# Patient Record
Sex: FEMALE | Race: WHITE | NOT HISPANIC OR LATINO | Employment: PART TIME | URBAN - METROPOLITAN AREA
[De-identification: names, ages, dates, MRNs, and addresses within clinical notes are randomized per-mention and may not be internally consistent; named-entity substitution may affect disease eponyms.]

---

## 2017-03-08 ENCOUNTER — TELEPHONE (OUTPATIENT)
Dept: FAMILY MEDICINE | Facility: CLINIC | Age: 52
End: 2017-03-08

## 2017-03-08 NOTE — TELEPHONE ENCOUNTER
Reason for Call:  Other    Detailed comments: Lay says she was patient of Dr Perez and has apt to see Angelica Glover on Friday, March 10. She wonders if she needs a pap this year. Last year she had a colposcopy with Dr Perez. She thinks she is starting menopause. She has not had a period for 9 months.     Phone Number Patient can be reached at: Home number on file [phone] (home)    Best Time: anytime    Can we leave a detailed message on this number? YES    Call taken on 3/8/2017 at 2:23 PM by Jazmyn Espinal

## 2017-03-10 ENCOUNTER — OFFICE VISIT (OUTPATIENT)
Dept: FAMILY MEDICINE | Facility: CLINIC | Age: 52
End: 2017-03-10
Payer: COMMERCIAL

## 2017-03-10 VITALS
BODY MASS INDEX: 23.91 KG/M2 | HEART RATE: 60 BPM | SYSTOLIC BLOOD PRESSURE: 112 MMHG | DIASTOLIC BLOOD PRESSURE: 58 MMHG | TEMPERATURE: 97.8 F | WEIGHT: 153.8 LBS

## 2017-03-10 DIAGNOSIS — S29.012A MUSCLE STRAIN OF LEFT UPPER BACK, INITIAL ENCOUNTER: Primary | ICD-10-CM

## 2017-03-10 PROCEDURE — 99213 OFFICE O/P EST LOW 20 MIN: CPT | Performed by: NURSE PRACTITIONER

## 2017-03-10 NOTE — NURSING NOTE
"Chief Complaint   Patient presents with     Back Pain       Initial /58 (BP Location: Right arm, Cuff Size: Adult Regular)  Pulse 60  Temp 97.8  F (36.6  C) (Tympanic)  Wt 153 lb 12.8 oz (69.8 kg)  BMI 23.91 kg/m2 Estimated body mass index is 23.91 kg/(m^2) as calculated from the following:    Height as of 2/15/16: 5' 7.25\" (1.708 m).    Weight as of this encounter: 153 lb 12.8 oz (69.8 kg).  Medication Reconciliation: complete    Health Maintenance that is potentially due pending provider review:  Mammogram and Colonoscopy/FIT    Gave pt phone number/pended order to schedule mammo and/or colonoscopy(or FIT)  Jose Luis Kang M.A.      "

## 2017-03-10 NOTE — MR AVS SNAPSHOT
After Visit Summary   3/10/2017    Lay Mccain    MRN: [id number]           Patient Information     Date Of Birth          1965        Visit Information        Provider Department      3/10/2017 9:20 AM Angelica Glover APRN CNP [facility]        Today's Diagnoses     Muscle strain of left upper back, initial encounter    -  1      Care Instructions    Start with Physical therapy     Can continue or start to see Chiropractor    Heat to area at least 20 minutes then do stretches after     Can try ibuprofen 600 mg two times daily at least for 3 days, if not noticing any change or improvement of pain - can stop     Return to clinic in 3 weeks if not improvement     [facility] Schedule      [place]- [phone]   2nd/4th Monday morning   Every other Wednesday afternoon      [place]- [phone]   2nd/4th Wednesday morning      [place]- [phone]   1st/3rd Wednesday morning      [facility]- [phone]   2nd/4th Monday afternoon   Every other Wednesday afternoon      [place]- [phone]   Every Monday morning   Every Tuesday afternoon   Every Wednesday, Thursday, Friday          Follow-ups after your visit        Additional Services     PHYSICAL THERAPY REFERRAL       *This therapy referral will be filtered to a centralized scheduling office at [facility] and the patient will receive a call to schedule an appointment at a [facility] location most convenient for them. *     [facility] provides Physical Therapy evaluation and treatment and many specialty services across the [facility] system.  If requesting a specialty program, please choose from the list below.    If you have not heard from the scheduling office within 2 business days, please call [phone] for all locations, with the exception of [place], please call [phone].  Treatment: Evaluation & Treatment  Special Instructions/Modalities:   Special Programs:  None    Please be aware that coverage of these services is subject to the terms and limitations of your health insurance plan.  Call member services at your health plan with any benefit or coverage questions.      **Note to Provider:  If you are referring outside of [facility] for the therapy appointment, please list the name of the location in the  special instructions  above, print the referral and give to the patient to schedule the appointment.                  Follow-up notes from your care team     Return in about 3 weeks (around 3/31/2017).      Who to contact     If you have questions or need follow up information about today's clinic visit or your schedule please contact [facility] directly at [phone].  Normal or non-critical lab and imaging results will be communicated to you by Wooshiihart, letter or phone within 4 business days after the clinic has received the results. If you do not hear from us within 7 days, please contact the clinic through AppSociallyt or phone. If you have a critical or abnormal lab result, we will notify you by phone as soon as possible.  Submit refill requests through Exosect or call your pharmacy and they will forward the refill request to us. Please allow 3 business days for your refill to be completed.          Additional Information About Your Visit        WooshiiharFeaturespace Information     Exosect gives you secure access to your electronic health record. If you see a primary care provider, you can also send messages to your care team and make appointments. If you have questions, please call your primary care clinic.  If you do not have a primary care provider, please call [phone] and they will assist you.        Care EveryWhere ID     This is your Care EveryWhere ID. This could be used by other organizations to access your [place] medical records  [id number]        Your Vitals Were     Pulse Temperature BMI (Body Mass Index)             60 97.8  F (36.6  C)  (Tympanic) 23.91 kg/m2          Blood Pressure from Last 3 Encounters:   03/10/17 112/58   03/14/16 124/62   02/15/16 110/72    Weight from Last 3 Encounters:   03/10/17 153 lb 12.8 oz (69.8 kg)   03/14/16 148 lb 3.2 oz (67.2 kg)   02/15/16 154 lb 3.2 oz (69.9 kg)              We Performed the Following     PHYSICAL THERAPY REFERRAL        Primary Care Provider Office Phone # Fax #    [facility]IR[facility] [phone]       [facility] [address] [facility] [address]        Thank you!     Thank you for choosing [facility]  for your care. Our goal is always to provide you with excellent care. Hearing back from our patients is one way we can continue to improve our services. Please take a few minutes to complete the written survey that you may receive in the mail after your visit with us. Thank you!             Your Updated Medication List - Protect others around you: Learn how to safely use, store and throw away your medicines at www.disposemymeds.org.          This list is accurate as of: 3/10/17 10:04 AM.  Always use your most recent med list.                   Brand Name Dispense Instructions for use    BIOTIN MAXIMUM STRENGTH 5 MG Caps   Generic drug:  biotin     30 capsule    Reported on 3/10/2017       CALCIUM + D PO      Take  by mouth.       DAILY MULTI VITAMIN/MINERALS PO      Take  by mouth.       hydroquinone 4 % Crea     30 g    Apply at bedtime       lidocaine 2 % topical gel    XYLOCAINE    60 mL    Apply  topically as needed.       TURMERIC PO          ZINC 15 PO      Reported on 3/10/2017

## 2017-03-10 NOTE — PATIENT INSTRUCTIONS
Start with Physical therapy     Can continue or start to see Chiropractor    Heat to area at least 20 minutes then do stretches after     Can try ibuprofen 600 mg two times daily at least for 3 days, if not noticing any change or improvement of pain - can stop     Return to clinic in 3 weeks if not improvement     [facility]      [place]- [phone]   2nd/4th Monday morning   Every other Wednesday afternoon      [place]- [phone]   2nd/4th Wednesday morning      [place]- [phone]   1st/3rd Wednesday morning      [facility] [phone]   2nd/4th Monday afternoon   Every other Wednesday afternoon      [place]- [phone]   Every Monday morning   Every Tuesday afternoon   Every Wednesday, Thursday, Friday

## 2017-03-10 NOTE — PROGRESS NOTES
SUBJECTIVE:                                                    Lay Mccain is a 51 year old female who presents to clinic today for the following health issues:    Back Pain      Duration: about a year ago.         Specific cause: none    Description:   Location of pain: upper back (left), shoulder blade   Character of pain: dull ache and burning  Pain radiation:wraps around to the front of left breast.   New numbness or weakness in legs, not attributed to pain:  no     Intensity: Currently 4/10, more of irritation    History:   Pain interferes with job: No  History of back problems: Has been dealing with for years.  Last 2 years though changed from shoulder blade area to more of back and coming forward  Any previous MRI or X-rays: None  Sees a specialist for back pain:  No  Therapies tried without relief: chiropractor, Physical Therapy and stretch    Alleviating factors:   Improved by: exercise and heat      Precipitating factors:  Worsened by: Nothing    Functional and Psychosocial Screen ([place] STarT Back):      Not performed today       Accompanying Signs & Symptoms:  Risk of Fracture:  None  Risk of Cauda Equina:  None  Risk of Infection:  None  Risk of Cancer:  None  Risk of Ankylosing Spondylitis:  Onset at age <35, male, AND morning back stiffness. no                  2 years ago [place] [employer] [place] going [place] the [place] on a tube and woke the next morning with this pain in shoulder   Constant, dull pain   At night sleeping okay  In the morning is the best       Problem list and histories reviewed & adjusted, as indicated.  Additional history: as documented    Patient Active Problem List   Diagnosis     CARDIOVASCULAR SCREENING; LDL GOAL LESS THAN 160     Vulvar discomfort     Premature atrial contractions     Annual physical exam     Left ankle swelling     Abnormal antinuclear antibody titer     Generalized anxiety disorder     Abnormal Pap smear of cervix     Past Surgical History   Procedure  Laterality Date     No history of surgery  11/2011       Social History   Substance Use Topics     Smoking status: Never Smoker     Smokeless tobacco: Never Used     Alcohol use Yes      Comment: Avg. 5 drinks per week     Family History   Problem Relation Age of Onset     HEART DISEASE Mother      bicuspid aortic valve     Hypertension Mother      CEREBROVASCULAR DISEASE Maternal Grandmother      HEART DISEASE Maternal Grandmother      CEREBROVASCULAR DISEASE Maternal Grandfather      HEART DISEASE Maternal Grandfather      HEART DISEASE Paternal Grandfather      CANCER Brother      Passed away 12/2014         Current Outpatient Prescriptions   Medication Sig Dispense Refill     TURMERIC PO        Multiple Vitamins-Minerals (DAILY MULTI VITAMIN/MINERALS PO) Take  by mouth.       Calcium Carbonate-Vitamin D (CALCIUM + D PO) Take  by mouth.       lidocaine (XYLOCAINE) 2 % jelly Apply  topically as needed. (Patient not taking: Reported on 3/10/2017) 60 mL 1     Zinc Sulfate (ZINC 15 PO) Reported on 3/10/2017       biotin (BIOTIN MAXIMUM STRENGTH) 5 MG CAPS Reported on 3/10/2017 30 capsule      hydroquinone 4 % CREA Apply at bedtime (Patient not taking: Reported on 3/10/2017) 30 g 3     Allergies   Allergen Reactions     Clindamycin Hives and Rash     Vicodin [Hydrocodone-Acetaminophen]      Passed out       Reviewed and updated as needed this visit by clinical staff  Tobacco  Allergies  Meds  Problems  Med Hx  Surg Hx  Fam Hx  Soc Hx        Reviewed and updated as needed this visit by Provider  Allergies  Meds  Problems         ROS:  Constitutional, HEENT, cardiovascular, pulmonary, gi and gu systems are negative, except as otherwise noted.    OBJECTIVE:                                                    /58 (BP Location: Right arm, Cuff Size: Adult Regular)  Pulse 60  Temp 97.8  F (36.6  C) (Tympanic)  Wt 153 lb 12.8 oz (69.8 kg)  BMI 23.91 kg/m2  Body mass index is 23.91 kg/(m^2).  GENERAL  APPEARANCE: healthy, alert and no distress  MS: extremities normal- no gross deformities noted, peripheral pulses normal, normal strength and range of motion through out upper extremities with some pulling in left armpit with abduction of the arms. No swelling or erythema noted    Diagnostic Test Results:  none      ASSESSMENT/PLAN:                                                      1. Muscle strain of left upper back, initial encounter  Has had on/off for a couple of years. Feels pulling in upper left shoulder into under armpit and left breast. No swelling or erythema.   - PHYSICAL THERAPY REFERRAL  - Supportive cares and anti-inflammatories  - Would consider muscle relaxant if anti-inflammatories ineffective     See Patient Instructions    KIRA [facility]

## 2017-03-15 ENCOUNTER — HOSPITAL ENCOUNTER (OUTPATIENT)
Dept: PHYSICAL THERAPY | Facility: CLINIC | Age: 52
Setting detail: THERAPIES SERIES
End: 2017-03-15
Attending: NURSE PRACTITIONER
Payer: COMMERCIAL

## 2017-03-15 PROCEDURE — 40000185 ZZHC STATISTIC PT OUTPT VISIT

## 2017-03-15 PROCEDURE — 97161 PT EVAL LOW COMPLEX 20 MIN: CPT | Mod: GP

## 2017-03-15 PROCEDURE — 97140 MANUAL THERAPY 1/> REGIONS: CPT | Mod: GP

## 2017-03-15 PROCEDURE — 97110 THERAPEUTIC EXERCISES: CPT | Mod: GP

## 2017-03-15 NOTE — PROGRESS NOTES
PT Cervical Evaluation 03/15/17 1500   General Information   Type of Visit Initial OP Ortho PT Evaluation   Start of Care Date 03/15/17   Referring Physician Angelica Glover, APRN, CNP   Patient/Family Goals Statement get rid of pain   Orders Evaluate and Treat   Date of Order 03/10/17   Insurance Type [employer]   Medical Diagnosis Muscle strain of left upper back, initial encounter    Surgical/Medical history reviewed Yes  (none)   Body Part(s)   Body Part(s) Cervical Spine   Presentation and Etiology   Pertinent history of current problem (include personal factors and/or comorbidities that impact the POC) Pt reports going [place] and woke up the next day with intense pain in her L shld blade area and into arm over 2 years ago in June 2014. She saw a chiropractor which helped her arm feel better. But after a while, she didn't feel like he helped anymore. 6 months later it progressed to around the side and to her L breast.  She reports being able to do all her activities, but she is tired of having the constant pain. Her sleep is occasionally interrupted by pain.   Impairments A. Pain;J. Burning;K. Numbness;L. Tingling  (N&T in L arm and fingers occasionally)   Functional Limitations (Pt is able to do things, but the pain is wearing her down)   Symptom Location L scapular area wrapping around to breast   How/Where did it occur During recreation/sports   Onset date of current episode/exacerbation 06/14/14   Chronicity Chronic   Pain rating (0-10 point scale) Best (/10);Worst (/10)   Best (/10) 1  (warm heating pad)   Worst (/10) 7  (sharp pains in breast)   Pain quality B. Dull;C. Aching;D. Burning;A. Sharp   Frequency of pain/symptoms A. Constant   Pain/symptoms are: Worse during the night   Pain/symptoms exacerbated by F. Nothing  (initially couldn't lie on L side)   Pain/symptoms eased by G. Heat  (exercising)   Progression of symptoms since onset: Unchanged   Prior Level of Function   Prior Level of  Function-Mobility independent   Prior Level of Function-ADLs independent   Current Level of Function   Current Community Support Family/friend caregiver   Patient role/employment history C. Homemaker   Living environment House/townhome   Home/community accessibility drives   Fall Risk Screen   Fall screen completed by PT   Per patient - Fall 2 or more times in past year? No   Per patient - Fall with injury in past year? No   Is patient a fall risk? No   Functional Scales   Functional Scales (SPADI 19.23)   Cervical Spine   Posture fwd head, rounded fwd shlds   Cervical/Thoracic/Shoulder ROM Comments WNL with mild discomfort near superior angle of L scapula with cervical flex and L rot   Shoulder Shrug (C2-C4) Strength 5/5   Shoulder Abd (C5) Strength 5/5   Shoulder ER (C5, C6) Strength 5/5   Shoulder IR (C5, C6) Strength 5/5   Elbow Flexion (C5, C6) Strength 5/5   Elbow Extension (C7) Strength 5/5   Wrist Extension (C6) Strength 5/5   Wrist Flexion (C7) Strength 5/5   Thumb Abd (C8) Strength 5/5   5th Finger Add (T1) Strength 5/5   Shoulder/Wrist/Hand Strength Comments B shld ext 4+/5, middle trap 4/5, lower trap 3+/5   Upper Trapezius Flexibility normal   Levator Scapula Flexibility normal   Scalene Flexibility normal   Pectoralis Minor Flexibility normal   Spurling Test -   Cervical Distraction Test -   Neer Impingement Test -   Hunter Impingement Test -   Segmental Mobility-Cervical tender to PA glide to C4-5   Palpation tender just medial and superior to superior angle of L scapula, no tenderness L UT, teres, infraspinatus, under L breast   Planned Therapy Interventions   Planned Therapy Interventions manual therapy;joint mobilization;strengthening;stretching   Clinical Impression   Criteria for Skilled Therapeutic Interventions Met yes, treatment indicated   PT Diagnosis L scapular pain. Signs and symptoms suggest cervical involvement aggravated by poor posture.   Influenced by the following impairments pain,  poor posture   Functional limitations due to impairments sleep   Clinical Presentation Stable/Uncomplicated   Clinical Presentation Rationale chronic, unchanged   Clinical Decision Making (Complexity) Low complexity   Therapy Frequency 1 time/week   Predicted Duration of Therapy Intervention (days/wks) 4 weeks   Risk & Benefits of therapy have been explained Yes   Patient, Family & other staff in agreement with plan of care Yes   Education Assessment   Preferred Learning Style Listening;Demonstration;Pictures/video   Barriers to Learning No barriers   ORTHO GOALS   PT Ortho Eval Goals 1;2;3   Ortho Goal 1   Goal Identifier 1   Goal Description Pt will sleep without waking from pain.   Target Date 03/29/17   Ortho Goal 2   Goal Identifier 2   Goal Description Pt will demonstrate proper posture 100% of the time.   Target Date 04/12/17   Ortho Goal 3   Goal Identifier 3   Goal Description Pt will be independent with HEP for optimal recovery.   Target Date 04/12/17   Total Evaluation Time   Total Evaluation Time 30     Tameka Arriola PT

## 2017-03-22 ENCOUNTER — HOSPITAL ENCOUNTER (OUTPATIENT)
Dept: PHYSICAL THERAPY | Facility: CLINIC | Age: 52
Setting detail: THERAPIES SERIES
End: 2017-03-22
Attending: FAMILY MEDICINE
Payer: COMMERCIAL

## 2017-03-22 PROCEDURE — 97110 THERAPEUTIC EXERCISES: CPT | Mod: GP | Performed by: PHYSICAL THERAPIST

## 2017-03-22 PROCEDURE — 40000718 ZZHC STATISTIC PT DEPARTMENT ORTHO VISIT: Performed by: PHYSICAL THERAPIST

## 2017-03-22 PROCEDURE — 97140 MANUAL THERAPY 1/> REGIONS: CPT | Mod: GP | Performed by: PHYSICAL THERAPIST

## 2017-03-30 ENCOUNTER — HOSPITAL ENCOUNTER (OUTPATIENT)
Dept: PHYSICAL THERAPY | Facility: CLINIC | Age: 52
Setting detail: THERAPIES SERIES
End: 2017-03-30
Attending: NURSE PRACTITIONER
Payer: COMMERCIAL

## 2017-03-30 PROCEDURE — 97110 THERAPEUTIC EXERCISES: CPT | Mod: GP | Performed by: PHYSICAL THERAPIST

## 2017-03-30 PROCEDURE — 97140 MANUAL THERAPY 1/> REGIONS: CPT | Mod: GP | Performed by: PHYSICAL THERAPIST

## 2017-03-30 PROCEDURE — 40000718 ZZHC STATISTIC PT DEPARTMENT ORTHO VISIT: Performed by: PHYSICAL THERAPIST

## 2017-04-21 ENCOUNTER — TELEPHONE (OUTPATIENT)
Dept: FAMILY MEDICINE | Facility: CLINIC | Age: 52
End: 2017-04-21

## 2017-04-21 NOTE — TELEPHONE ENCOUNTER
4/21/2017    Call Regarding Preventive Health Screening Colonoscopy    Attempt 1    Message on voicemail     Comments:         Outreach   Rosalia Xiao

## 2017-05-15 ENCOUNTER — HOSPITAL ENCOUNTER (OUTPATIENT)
Dept: MAMMOGRAPHY | Facility: CLINIC | Age: 52
Discharge: HOME | End: 2017-05-15
Attending: NURSE PRACTITIONER | Admitting: NURSE PRACTITIONER
Payer: COMMERCIAL

## 2017-05-15 DIAGNOSIS — Z12.31 VISIT FOR SCREENING MAMMOGRAM: ICD-10-CM

## 2017-05-15 PROCEDURE — G0202 SCR MAMMO BI INCL CAD: HCPCS

## 2018-08-07 ENCOUNTER — OFFICE VISIT (OUTPATIENT)
Dept: FAMILY MEDICINE | Facility: CLINIC | Age: 53
End: 2018-08-07
Payer: COMMERCIAL

## 2018-08-07 VITALS
SYSTOLIC BLOOD PRESSURE: 108 MMHG | OXYGEN SATURATION: 100 % | HEIGHT: 68 IN | DIASTOLIC BLOOD PRESSURE: 72 MMHG | BODY MASS INDEX: 21.79 KG/M2 | TEMPERATURE: 97.8 F | WEIGHT: 143.8 LBS | HEART RATE: 77 BPM

## 2018-08-07 DIAGNOSIS — Z01.419 WELL WOMAN EXAM WITH ROUTINE GYNECOLOGICAL EXAM: Primary | ICD-10-CM

## 2018-08-07 PROCEDURE — G0145 SCR C/V CYTO,THINLAYER,RESCR: HCPCS | Performed by: FAMILY MEDICINE

## 2018-08-07 PROCEDURE — G0476 HPV COMBO ASSAY CA SCREEN: HCPCS | Performed by: FAMILY MEDICINE

## 2018-08-07 PROCEDURE — 99396 PREV VISIT EST AGE 40-64: CPT | Performed by: FAMILY MEDICINE

## 2018-08-07 NOTE — MR AVS SNAPSHOT
After Visit Summary   8/7/2018    Lay Mccain    MRN: [id number]           Patient Information     Date Of Birth          1965        Visit Information        Provider Department      8/7/2018 8:20 AM Lupillo Lewis MD [facility]        Today's Diagnoses     Well woman exam with routine gynecological exam    -  1      Care Instructions    For the inner ear balance, the canalith repositioning can be very helpful.    Plan labs again in 2021.        Preventive Health Recommendations  Female Ages 50 - 64    Yearly exam: See your health care provider every year in order to  o Review health changes.   o Discuss preventive care.    o Review your medicines if your doctor has prescribed any.      Get a Pap test every three years (unless you have an abnormal result and your provider advises testing more often).    If you get Pap tests with HPV test, you only need to test every 5 years, unless you have an abnormal result.     You do not need a Pap test if your uterus was removed (hysterectomy) and you have not had cancer.    You should be tested each year for STDs (sexually transmitted diseases) if you're at risk.     Have a mammogram every 1 to 2 years.    Have a colonoscopy at age 50, or have a yearly FIT test (stool test). These exams screen for colon cancer.      Have a cholesterol test every 5 years, or more often if advised.    Have a diabetes test (fasting glucose) every three years. If you are at risk for diabetes, you should have this test more often.     If you are at risk for osteoporosis (brittle bone disease), think about having a bone density scan (DEXA).    Shots: Get a flu shot each year. Get a tetanus shot every 10 years.    Nutrition:     Eat at least 5 servings of fruits and vegetables each day.    Eat whole-grain bread, whole-wheat pasta and brown rice instead of white grains and rice.    Get adequate Calcium and Vitamin D.     Lifestyle    Exercise at least 150  "minutes a week (30 minutes a day, 5 days a week). This will help you control your weight and prevent disease.    Limit alcohol to one drink per day.    No smoking.     Wear sunscreen to prevent skin cancer.     See your dentist every six months for an exam and cleaning.    See your eye doctor every 1 to 2 years.            Follow-ups after your visit        Who to contact     If you have questions or need follow up information about today's clinic visit or your schedule please contact [facility] directly at [phone].  Normal or non-critical lab and imaging results will be communicated to you by Diagnostic Imaging Internationalhart, letter or phone within 4 business days after the clinic has received the results. If you do not hear from us within 7 days, please contact the clinic through Pollfish or phone. If you have a critical or abnormal lab result, we will notify you by phone as soon as possible.  Submit refill requests through Pollfish or call your pharmacy and they will forward the refill request to us. Please allow 3 business days for your refill to be completed.          Additional Information About Your Visit        Pollfish Information     Pollfish gives you secure access to your electronic health record. If you see a primary care provider, you can also send messages to your care team and make appointments. If you have questions, please call your primary care clinic.  If you do not have a primary care provider, please call [phone] and they will assist you.        Care EveryWhere ID     This is your Care EveryWhere ID. This could be used by other organizations to access your [place] medical records  [id number]        Your Vitals Were     Pulse Temperature Height Pulse Oximetry BMI (Body Mass Index)       77 97.8  F (36.6  C) (Tympanic) 5' 7.5\" (1.715 m) 100% 22.19 kg/m2        Blood Pressure from Last 3 Encounters:   08/07/18 108/72   03/10/17 112/58   03/14/16 124/62    Weight from Last 3 Encounters:   08/07/18 " 143 lb 12.8 oz (65.2 kg)   03/10/17 153 lb 12.8 oz (69.8 kg)   03/14/16 148 lb 3.2 oz (67.2 kg)              We Performed the Following     HPV High Risk Types DNA Cervical     Pap imaged thin layer screen with HPV - recommended age 30 - 65 years (select HPV order below)        Primary Care Provider Office Phone # Fax #    Angelica Glover, KIRA [phone]       [facility] [address] [address]        Equal Access to Services     DANNY BUSTOS : Hadii astrid garcia hadasho Soomaali, waaxda luqadaha, qaybta kaalmada cyndee, rafael wright . So [facility] [phone].    ATENCIÓN: Si habla español, tiene a eubanks disposición servicios gratuitos de asistencia lingüística. [facility] [phone].    We comply with applicable federal civil rights laws and [place] laws. We do not discriminate on the basis of race, color, national origin, age, disability, sex, sexual orientation, or gender identity.            Thank you!     Thank you for choosing [facility]  for your care. Our goal is always to provide you with excellent care. Hearing back from our patients is one way we can continue to improve our services. Please take a few minutes to complete the written survey that you may receive in the mail after your visit with us. Thank you!             Your Updated Medication List - Protect others around you: Learn how to safely use, store and throw away your medicines at www.disposemymeds.org.          This list is accurate as of 8/7/18  9:05 AM.  Always use your most recent med list.                   Brand Name Dispense Instructions for use Diagnosis    BIOTIN MAXIMUM STRENGTH 5 MG Caps   Generic drug:  biotin     30 capsule    Reported on 3/10/2017    Telogen hair loss       CALCIUM + D PO      Take  by mouth.        DAILY MULTI VITAMIN/MINERALS PO      Take  by mouth.        hydroquinone 4 % Crea     30 g    Apply at bedtime    Melasma        lidocaine 2 % topical gel    XYLOCAINE    60 mL    Apply  topically as needed.    Vulvar discomfort       TURMERIC PO           ZINC 15 PO      Reported on 3/10/2017

## 2018-08-07 NOTE — PROGRESS NOTES
"   SUBJECTIVE:   CC: Lay Mccain is an 52 year old woman who presents for preventive health visit.   Chief Complaint   Patient presents with     Physical     Establish Care     Consult     would like to know if she should see a chiropractor, been getting dizzy when standing up mother had \"rocks in her ears\"         Healthy Habits:    Do you get at least three servings of calcium containing foods daily (dairy, green leafy vegetables, etc.)? yes    Amount of exercise or daily activities, outside of work: 7 day(s) per week, 2 miles a walk a day 45-60 min    Problems taking medications regularly No    Medication side effects: No    Have you had an eye exam in the past two years? No, knows she is due    Do you see a dentist twice per year? No, needs to go     Do you have sleep apnea, excessive snoring or daytime drowsiness?no        Today's PHQ-2 Score:   PHQ-2 ( 1999 Pfizer) 12/30/2014 12/9/2013   Q1: Little interest or pleasure in doing things 0 0   Q2: Feeling down, depressed or hopeless 0 0   PHQ-2 Score 0 0       Abuse: Current or Past(Physical, Sexual or Emotional)- No  Do you feel safe in your environment - Yes    Social History   Substance Use Topics     Smoking status: Never Smoker     Smokeless tobacco: Never Used     Alcohol use Yes      Comment: Avg. 5 drinks per week     If you drink alcohol do you typically have >3 drinks per day or >7 drinks per week? No                     Reviewed orders with patient.  Reviewed health maintenance and updated orders accordingly - Yes  BP Readings from Last 3 Encounters:   08/07/18 108/72   03/10/17 112/58   03/14/16 124/62    Wt Readings from Last 3 Encounters:   08/07/18 143 lb 12.8 oz (65.2 kg)   03/10/17 153 lb 12.8 oz (69.8 kg)   03/14/16 148 lb 3.2 oz (67.2 kg)                    Patient over age 50, mutual decision to screen reflected in health maintenance.    Pertinent mammograms are reviewed under the imaging tab.  History of abnormal Pap smear: NO - age 30- 65 " "PAP every 3 years recommended  PAP / HPV Latest Ref Rng & Units 2/15/2016 12/23/2014 12/9/2013   PAP - NIL NIL OTHER-NIL, See Result   HPV 16 DNA NEG Negative - -   HPV 18 DNA NEG Negative - -   OTHER HR HPV NEG Negative - -     Reviewed and updated as needed this visit by clinical staff         Reviewed and updated as needed this visit by Provider            ROS:  CONSTITUTIONAL: NEGATIVE for fever, chills, change in weight  INTEGUMENTARY/SKIN: NEGATIVE for worrisome rashes, moles or lesions  EYES: NEGATIVE for vision changes or irritation  ENT: NEGATIVE for ear, mouth and throat problems  RESP: NEGATIVE for significant cough or SOB  BREAST: NEGATIVE for masses, tenderness or discharge  CV: NEGATIVE for chest pain, palpitations or peripheral edema  GI: NEGATIVE for nausea, abdominal pain, heartburn, or change in bowel habits  : NEGATIVE for unusual urinary or vaginal symptoms. No vaginal bleeding.  MUSCULOSKELETAL: NEGATIVE for significant arthralgias or myalgia  NEURO: NEGATIVE for weakness, dizziness or paresthesias  PSYCHIATRIC: NEGATIVE for changes in mood or affect     OBJECTIVE:   /72  Pulse 77  Temp 97.8  F (36.6  C) (Tympanic)  Ht 5' 7.5\" (1.715 m)  Wt 143 lb 12.8 oz (65.2 kg)  SpO2 100%  BMI 22.19 kg/m2  EXAM:  GENERAL APPEARANCE: healthy, alert and no distress  EYES: Eyes grossly normal to inspection, PERRL and conjunctivae and sclerae normal  HENT: ear canals and TM's normal, nose and mouth without ulcers or lesions, oropharynx clear and oral mucous membranes moist  NECK: no adenopathy, no asymmetry, masses, or scars and thyroid normal to palpation  RESP: lungs clear to auscultation - no rales, rhonchi or wheezes  BREAST: normal without masses, tenderness or nipple discharge and no palpable axillary masses or adenopathy  CV: regular rate and rhythm, normal S1 S2, no S3 or S4, no murmur, click or rub, no peripheral edema and peripheral pulses strong  ABDOMEN: soft, nontender, no " "hepatosplenomegaly, no masses and bowel sounds normal   (female): normal female external genitalia, normal urethral meatus, vaginal mucosal atrophy noted, normal cervix  without masses or abnormal discharge  MS: no musculoskeletal defects are noted and gait is age appropriate without ataxia  SKIN: no suspicious lesions or rashes  NEURO: Normal strength and tone, sensory exam grossly normal, mentation intact and speech normal  PSYCH: mentation appears normal and affect normal/bright    Diagnostic Test Results:  none     ASSESSMENT/PLAN:   Lay was seen today for physical, establish care and consult.    Diagnoses and all orders for this visit:    Well woman exam with routine gynecological exam  -     Pap imaged thin layer screen with HPV - recommended age 30 - 65 years (select HPV order below)  -     HPV High Risk Types DNA Cervical          COUNSELING:   Reviewed preventive health counseling, as reflected in patient instructions       Regular exercise       Healthy diet/nutrition       Vision screening       (Deana)menopause management    BP Readings from Last 1 Encounters:   03/10/17 112/58     Estimated body mass index is 23.91 kg/(m^2) as calculated from the following:    Height as of 2/15/16: 5' 7.25\" (1.708 m).    Weight as of 3/10/17: 153 lb 12.8 oz (69.8 kg).           reports that she has never smoked. She has never used smokeless tobacco.      Counseling Resources:  ATP IV Guidelines  Pooled Cohorts Equation Calculator  Breast Cancer Risk Calculator  FRAX Risk Assessment  ICSI Preventive Guidelines  Dietary Guidelines for Americans, 2010  USDA's MyPlate  ASA Prophylaxis  Lung CA Screening    Lupillo Lewis MD  [facility]  "

## 2018-08-07 NOTE — PATIENT INSTRUCTIONS
For the inner ear balance, the canalith repositioning can be very helpful.    Plan labs again in 2021.        Preventive Health Recommendations  Female Ages 50 - 64    Yearly exam: See your health care provider every year in order to  o Review health changes.   o Discuss preventive care.    o Review your medicines if your doctor has prescribed any.      Get a Pap test every three years (unless you have an abnormal result and your provider advises testing more often).    If you get Pap tests with HPV test, you only need to test every 5 years, unless you have an abnormal result.     You do not need a Pap test if your uterus was removed (hysterectomy) and you have not had cancer.    You should be tested each year for STDs (sexually transmitted diseases) if you're at risk.     Have a mammogram every 1 to 2 years.    Have a colonoscopy at age 50, or have a yearly FIT test (stool test). These exams screen for colon cancer.      Have a cholesterol test every 5 years, or more often if advised.    Have a diabetes test (fasting glucose) every three years. If you are at risk for diabetes, you should have this test more often.     If you are at risk for osteoporosis (brittle bone disease), think about having a bone density scan (DEXA).    Shots: Get a flu shot each year. Get a tetanus shot every 10 years.    Nutrition:     Eat at least 5 servings of fruits and vegetables each day.    Eat whole-grain bread, whole-wheat pasta and brown rice instead of white grains and rice.    Get adequate Calcium and Vitamin D.     Lifestyle    Exercise at least 150 minutes a week (30 minutes a day, 5 days a week). This will help you control your weight and prevent disease.    Limit alcohol to one drink per day.    No smoking.     Wear sunscreen to prevent skin cancer.     See your dentist every six months for an exam and cleaning.    See your eye doctor every 1 to 2 years.

## 2018-08-09 LAB
COPATH REPORT: NORMAL
PAP: NORMAL

## 2018-08-10 LAB
FINAL DIAGNOSIS: NORMAL
HPV HR 12 DNA CVX QL NAA+PROBE: NEGATIVE
HPV16 DNA SPEC QL NAA+PROBE: NEGATIVE
HPV18 DNA SPEC QL NAA+PROBE: NEGATIVE
SPECIMEN DESCRIPTION: NORMAL
SPECIMEN SOURCE CVX/VAG CYTO: NORMAL

## 2019-07-31 ENCOUNTER — TELEPHONE (OUTPATIENT)
Dept: FAMILY MEDICINE | Facility: CLINIC | Age: 54
End: 2019-07-31

## 2019-07-31 NOTE — TELEPHONE ENCOUNTER
Panel Management Review      Patient has the following on her problem list: None      Composite cancer screening  Chart review shows that this patient is due/due soon for the following Mammogram, Colonoscopy and Fecal Colorectal (FIT)  Summary:    Patient is due/failing the following:   COLONOSCOPY, FIT, MAMMOGRAM and PHYSICAL    Action needed:   Patient needs office visit for Physcial. Patient needs referral/order: colonoscopy or fit test. Patient needs to schedule a mammogram. Order is in for the mammo.    Type of outreach:    Sent letter.    Questions for provider review:    None                                                                                                                                    Anya Nixon MA

## 2019-07-31 NOTE — LETTER
[facility]  [address]  [facility] [address]  Phone: [phone]  July 31, 2019      Lay Mccain  [address] [place] [id number]      Dear Lay,    We care about your health and have reviewed your health plan including your medical conditions, medications, and lab results.  Based on this review, it is recommended that you follow up regarding the following health topic(s):  -Breast Cancer Screening  -Colon Cancer Screening  -Wellness (Physical) Visit     We recommend you take the following action(s):  -schedule a WELLNESS (Physical) APPOINTMENT.  We will perform the following labs: Lipids (fasting cholesterol - nothing to eat except water and/or meds for 8-10 hours).  -schedule a MAMMOGRAM which is due. Please disregard this reminder if you have had this exam elsewhere within the last 1-2 years please let us know so we can update your records.  -schedule a COLONOSCOPY to look for colon cancer (due every 10 years or 5 years in higher risk situations.)  Colonoscopies can prevent 90-95% of colon cancer deaths.  Problem lesions can be removed before they ever become cancer.  If you do not wish to do a colonoscopy or cannot afford to do one at this time, there is another option called a Fecal Immunochemical Occult Blood Test (FIT) a take home stool sample kit.  It does not replace the colonoscopy for colorectal cancer screening, but it can detect hidden bleeding in the lower colon.  It does need to be repeated every year and if a positive result is obtained, you would be referred for a colonoscopy.  If you have completed either one of these tests at another facility, please have the records sent to our clinic for our records.     Please call us at the [facility] [phone] (or use [employer]) to address the above recommendations.     Thank you for trusting [facility] and we appreciate the opportunity to serve you.  We look forward to supporting  your healthcare needs in the future.    Healthy Regards,    Your Health Care Team  [facility]

## 2019-10-20 ENCOUNTER — OFFICE VISIT (OUTPATIENT)
Dept: URGENT CARE | Facility: URGENT CARE | Age: 54
End: 2019-10-20
Payer: COMMERCIAL

## 2019-10-20 VITALS
HEART RATE: 74 BPM | DIASTOLIC BLOOD PRESSURE: 75 MMHG | TEMPERATURE: 96.9 F | WEIGHT: 147 LBS | BODY MASS INDEX: 22.68 KG/M2 | OXYGEN SATURATION: 99 % | SYSTOLIC BLOOD PRESSURE: 120 MMHG

## 2019-10-20 DIAGNOSIS — L03.011 FELON OF FINGER OF RIGHT HAND: Primary | ICD-10-CM

## 2019-10-20 PROCEDURE — 99213 OFFICE O/P EST LOW 20 MIN: CPT | Performed by: HOSPITALIST

## 2019-10-20 NOTE — PROGRESS NOTES
Pt came here for pain on the right index finger. Starting yesterday. No fever or chill. Has swelling on the tip of the finger too.     Allergies   Allergen Reactions     Clindamycin Hives and Rash     Vicodin [Hydrocodone-Acetaminophen]      Passed out       Past Medical History:   Diagnosis Date     ASCUS favor benign 2012    -HPV per [facility] rpt cotesting 3 yrs: 2015     Vulvodynia        Calcium Carbonate-Vitamin D (CALCIUM + D PO), Take  by mouth.  Multiple Vitamins-Minerals (DAILY MULTI VITAMIN/MINERALS PO), Take  by mouth.  TURMERIC PO,   biotin (BIOTIN MAXIMUM STRENGTH) 5 MG CAPS, Reported on 3/10/2017  hydroquinone 4 % CREA, Apply at bedtime (Patient not taking: Reported on 3/10/2017)  lidocaine (XYLOCAINE) 2 % jelly, Apply  topically as needed. (Patient not taking: Reported on 3/10/2017)  Zinc Sulfate (ZINC 15 PO), Reported on 3/10/2017    No current facility-administered medications on file prior to visit.       Social History     Tobacco Use     Smoking status: Never Smoker     Smokeless tobacco: Never Used   Substance Use Topics     Alcohol use: Yes     Comment: Avg. 5 drinks per week       ROS:  12 point ROS is done and aside that mention above all other review of system is negative    OBJECTIVE:  /75   Pulse 74   Temp 96.9  F (36.1  C) (Tympanic)   Wt 66.7 kg (147 lb)   SpO2 99%   BMI 22.68 kg/m    GENERAL APPEARANCE: healthy, alert and moderate distress  EYES: conjunctiva clear  Right index finger show some pus collection. I did poke it after cleaning the finger with alcohol swab. I did poke with a 16g needle. Pus is coming out. Rather bloody. I did clean it again with alcohol swab afterwards and cover with a band aid      No results found for this or any previous visit (from the past 168 hour(s)).     ASSESSMENT:     ICD-10-CM    1. Felon of finger of right hand L03.011 amoxicillin-clavulanate (AUGMENTIN) 875-125 MG tablet     acetaminophen-codeine (TYLENOL #3) 300-30 MG tablet          PLAN:    Will start augmentin 875 po bid for 10 days for the infection. Tylenol and ibuprofen prn for  Pain. Will give tylenol 3 if the pain is not better with tylenol or ibuprofen.   Lots of rest and fluids.  Follow up in 4-7 days  if not better or sooner if getting worse .    Piter Higgins MD MD

## 2019-10-22 ENCOUNTER — OFFICE VISIT (OUTPATIENT)
Dept: FAMILY MEDICINE | Facility: CLINIC | Age: 54
End: 2019-10-22
Payer: COMMERCIAL

## 2019-10-22 VITALS
SYSTOLIC BLOOD PRESSURE: 120 MMHG | HEIGHT: 67 IN | WEIGHT: 145 LBS | TEMPERATURE: 97.3 F | OXYGEN SATURATION: 98 % | DIASTOLIC BLOOD PRESSURE: 80 MMHG | RESPIRATION RATE: 14 BRPM | HEART RATE: 75 BPM | BODY MASS INDEX: 22.76 KG/M2

## 2019-10-22 DIAGNOSIS — Z12.11 SPECIAL SCREENING FOR MALIGNANT NEOPLASMS, COLON: ICD-10-CM

## 2019-10-22 DIAGNOSIS — Z00.00 ROUTINE GENERAL MEDICAL EXAMINATION AT A HEALTH CARE FACILITY: Primary | ICD-10-CM

## 2019-10-22 DIAGNOSIS — R00.2 PALPITATIONS: ICD-10-CM

## 2019-10-22 DIAGNOSIS — Z12.11 SCREEN FOR COLON CANCER: ICD-10-CM

## 2019-10-22 DIAGNOSIS — Z13.220 SCREENING CHOLESTEROL LEVEL: ICD-10-CM

## 2019-10-22 LAB
ALBUMIN SERPL-MCNC: 4 G/DL (ref 3.4–5)
ALP SERPL-CCNC: 52 U/L (ref 40–150)
ALT SERPL W P-5'-P-CCNC: 25 U/L (ref 0–50)
ANION GAP SERPL CALCULATED.3IONS-SCNC: 3 MMOL/L (ref 3–14)
AST SERPL W P-5'-P-CCNC: 18 U/L (ref 0–45)
BASOPHILS # BLD AUTO: 0.1 10E9/L (ref 0–0.2)
BASOPHILS NFR BLD AUTO: 1 %
BILIRUB SERPL-MCNC: 0.5 MG/DL (ref 0.2–1.3)
BUN SERPL-MCNC: 14 MG/DL (ref 7–30)
CALCIUM SERPL-MCNC: 9.3 MG/DL (ref 8.5–10.1)
CHLORIDE SERPL-SCNC: 107 MMOL/L (ref 94–109)
CHOLEST SERPL-MCNC: 172 MG/DL
CO2 SERPL-SCNC: 27 MMOL/L (ref 20–32)
CREAT SERPL-MCNC: 0.73 MG/DL (ref 0.52–1.04)
DIFFERENTIAL METHOD BLD: NORMAL
EOSINOPHIL # BLD AUTO: 0.1 10E9/L (ref 0–0.7)
EOSINOPHIL NFR BLD AUTO: 1.8 %
ERYTHROCYTE [DISTWIDTH] IN BLOOD BY AUTOMATED COUNT: 13 % (ref 10–15)
GFR SERPL CREATININE-BSD FRML MDRD: >90 ML/MIN/{1.73_M2}
GLUCOSE SERPL-MCNC: 97 MG/DL (ref 70–99)
HCT VFR BLD AUTO: 42.2 % (ref 35–47)
HDLC SERPL-MCNC: 86 MG/DL
HGB BLD-MCNC: 13.6 G/DL (ref 11.7–15.7)
LDLC SERPL CALC-MCNC: 77 MG/DL
LYMPHOCYTES # BLD AUTO: 1.4 10E9/L (ref 0.8–5.3)
LYMPHOCYTES NFR BLD AUTO: 19.7 %
MCH RBC QN AUTO: 29.8 PG (ref 26.5–33)
MCHC RBC AUTO-ENTMCNC: 32.2 G/DL (ref 31.5–36.5)
MCV RBC AUTO: 92 FL (ref 78–100)
MONOCYTES # BLD AUTO: 0.6 10E9/L (ref 0–1.3)
MONOCYTES NFR BLD AUTO: 8.9 %
NEUTROPHILS # BLD AUTO: 4.9 10E9/L (ref 1.6–8.3)
NEUTROPHILS NFR BLD AUTO: 68.6 %
NONHDLC SERPL-MCNC: 86 MG/DL
PLATELET # BLD AUTO: 233 10E9/L (ref 150–450)
POTASSIUM SERPL-SCNC: 4.1 MMOL/L (ref 3.4–5.3)
PROT SERPL-MCNC: 7.8 G/DL (ref 6.8–8.8)
RBC # BLD AUTO: 4.57 10E12/L (ref 3.8–5.2)
SODIUM SERPL-SCNC: 137 MMOL/L (ref 133–144)
TRIGL SERPL-MCNC: 47 MG/DL
TSH SERPL DL<=0.005 MIU/L-ACNC: 2.46 MU/L (ref 0.4–4)
WBC # BLD AUTO: 7.2 10E9/L (ref 4–11)

## 2019-10-22 PROCEDURE — 84443 ASSAY THYROID STIM HORMONE: CPT | Performed by: NURSE PRACTITIONER

## 2019-10-22 PROCEDURE — 85025 COMPLETE CBC W/AUTO DIFF WBC: CPT | Performed by: NURSE PRACTITIONER

## 2019-10-22 PROCEDURE — 99396 PREV VISIT EST AGE 40-64: CPT | Performed by: NURSE PRACTITIONER

## 2019-10-22 PROCEDURE — 80061 LIPID PANEL: CPT | Performed by: NURSE PRACTITIONER

## 2019-10-22 PROCEDURE — 36415 COLL VENOUS BLD VENIPUNCTURE: CPT | Performed by: NURSE PRACTITIONER

## 2019-10-22 PROCEDURE — 99213 OFFICE O/P EST LOW 20 MIN: CPT | Mod: 25 | Performed by: NURSE PRACTITIONER

## 2019-10-22 PROCEDURE — 80053 COMPREHEN METABOLIC PANEL: CPT | Performed by: NURSE PRACTITIONER

## 2019-10-22 ASSESSMENT — ENCOUNTER SYMPTOMS
JOINT SWELLING: 0
HEMATOCHEZIA: 0
ABDOMINAL PAIN: 0
COUGH: 0
NERVOUS/ANXIOUS: 0
FREQUENCY: 0
ARTHRALGIAS: 0
CHILLS: 0
FEVER: 0
HEADACHES: 0
EYE PAIN: 0
HEARTBURN: 0
HEMATURIA: 0
CONSTIPATION: 0
DIZZINESS: 0
DIARRHEA: 0

## 2019-10-22 ASSESSMENT — PAIN SCALES - GENERAL: PAINLEVEL: NO PAIN (0)

## 2019-10-22 ASSESSMENT — MIFFLIN-ST. JEOR: SCORE: 1295.35

## 2019-10-22 NOTE — PROGRESS NOTES
Declides flu shot and td shot        SUBJECTIVE:   CC: Lay Mccain is an 54 year old woman who presents for preventive health visit.     Healthy Habits:     Getting at least 3 servings of Calcium per day:  Yes    Bi-annual eye exam:  Yes    Dental care twice a year:  NO    Sleep apnea or symptoms of sleep apnea:  None    Diet:  Regular (no restrictions)    Frequency of exercise:  6-7 days/week    Duration of exercise:  Greater than 60 minutes    Taking medications regularly:  Not Applicable    Medication side effects:  Not applicable    PHQ-2 Total Score: 0    Additional concerns today:  Yes    Urgent care follow up- infection in finger   Intermittent palpitations no shortness of breath no diaphoresis no chest pain    Today's PHQ-2 Score:   PHQ-2 ( 1999 Pfizer) 10/22/2019   Q1: Little interest or pleasure in doing things 0   Q2: Feeling down, depressed or hopeless 0   PHQ-2 Score 0   Q1: Little interest or pleasure in doing things Not at all   Q2: Feeling down, depressed or hopeless Not at all   PHQ-2 Score 0       Abuse: Current or Past(Physical, Sexual or Emotional)- No  Do you feel safe in your environment? Yes    Social History     Tobacco Use     Smoking status: Never Smoker     Smokeless tobacco: Never Used   Substance Use Topics     Alcohol use: Yes     Comment: Avg. 5 drinks per week     If you drink alcohol do you typically have >3 drinks per day or >7 drinks per week? No    Alcohol Use 10/22/2019   Prescreen: >3 drinks/day or >7 drinks/week? No   Prescreen: >3 drinks/day or >7 drinks/week? -   No flowsheet data found.    Reviewed orders with patient.  Reviewed health maintenance and updated orders accordingly - Yes  BP Readings from Last 3 Encounters:   10/22/19 120/80   10/20/19 120/75   08/07/18 108/72    Wt Readings from Last 3 Encounters:   10/22/19 65.8 kg (145 lb)   10/20/19 66.7 kg (147 lb)   08/07/18 65.2 kg (143 lb 12.8 oz)                    Mammogram Screening: Patient over age 50, mutual  "decision to screen reflected in health maintenance.    Pertinent mammograms are reviewed under the imaging tab.  History of abnormal Pap smear:   Last 3 Pap and HPV Results:   PAP / HPV Latest Ref Rng & Units 8/7/2018 2/15/2016 12/23/2014   PAP - NIL NIL NIL   HPV 16 DNA NEG:Negative Negative Negative -   HPV 18 DNA NEG:Negative Negative Negative -   OTHER HR HPV NEG:Negative Negative Negative -     PAP / HPV Latest Ref Rng & Units 8/7/2018 2/15/2016 12/23/2014   PAP - NIL NIL NIL   HPV 16 DNA NEG:Negative Negative Negative -   HPV 18 DNA NEG:Negative Negative Negative -   OTHER HR HPV NEG:Negative Negative Negative -     Reviewed and updated as needed this visit by clinical staff  Tobacco  Allergies  Meds  Med Hx  Surg Hx  Fam Hx  Soc Hx        Reviewed and updated as needed this visit by Provider            Review of Systems   Constitutional: Negative for chills and fever.   HENT: Negative for congestion, ear pain and hearing loss.    Eyes: Negative for pain.   Respiratory: Negative for cough.    Cardiovascular: Negative for chest pain and peripheral edema.   Gastrointestinal: Negative for abdominal pain, constipation, diarrhea, heartburn and hematochezia.   Genitourinary: Negative for frequency, genital sores and hematuria.   Musculoskeletal: Negative for arthralgias and joint swelling.   Neurological: Negative for dizziness and headaches.   Psychiatric/Behavioral: Negative for mood changes. The patient is not nervous/anxious.           OBJECTIVE:   /80   Pulse 75   Temp 97.3  F (36.3  C) (Tympanic)   Resp 14   Ht 1.702 m (5' 7\")   Wt 65.8 kg (145 lb)   LMP 11/29/2013   SpO2 98%   BMI 22.71 kg/m    Physical Exam  GENERAL: healthy, alert and no distress  EYES: Eyes grossly normal to inspection, PERRL and conjunctivae and sclerae normal  HENT: ear canals and TM's normal, nose and mouth without ulcers or lesions  NECK: no adenopathy, no asymmetry, masses, or scars and thyroid normal to " palpation  RESP: lungs clear to auscultation - no rales, rhonchi or wheezes  BREAST: normal without masses, tenderness or nipple discharge and no palpable axillary masses or adenopathy  CV: regular rate and rhythm, normal S1 S2, no S3 or S4, no murmur, click or rub, no peripheral edema and peripheral pulses strong  ABDOMEN: soft, nontender, no hepatosplenomegaly, no masses and bowel sounds normal  MS: no gross musculoskeletal defects noted, no edema  SKIN: Right index finger swollen with no drainage today  NEURO: Normal strength and tone, mentation intact and speech normal  PSYCH: mentation appears normal, affect normal/bright    Diagnostic Test Results:  Labs reviewed in Epic  Results for orders placed or performed in visit on 10/22/19   Lipid panel reflex to direct LDL Fasting   Result Value Ref Range    Cholesterol 172 <200 mg/dL    Triglycerides 47 <150 mg/dL    HDL Cholesterol 86 >49 mg/dL    LDL Cholesterol Calculated 77 <100 mg/dL    Non HDL Cholesterol 86 <130 mg/dL   TSH with free T4 reflex   Result Value Ref Range    TSH 2.46 0.40 - 4.00 mU/L   CBC with platelets and differential   Result Value Ref Range    WBC 7.2 4.0 - 11.0 10e9/L    RBC Count 4.57 3.8 - 5.2 10e12/L    Hemoglobin 13.6 11.7 - 15.7 g/dL    Hematocrit 42.2 35.0 - 47.0 %    MCV 92 78 - 100 fl    MCH 29.8 26.5 - 33.0 pg    MCHC 32.2 31.5 - 36.5 g/dL    RDW 13.0 10.0 - 15.0 %    Platelet Count 233 150 - 450 10e9/L    % Neutrophils 68.6 %    % Lymphocytes 19.7 %    % Monocytes 8.9 %    % Eosinophils 1.8 %    % Basophils 1.0 %    Absolute Neutrophil 4.9 1.6 - 8.3 10e9/L    Absolute Lymphocytes 1.4 0.8 - 5.3 10e9/L    Absolute Monocytes 0.6 0.0 - 1.3 10e9/L    Absolute Eosinophils 0.1 0.0 - 0.7 10e9/L    Absolute Basophils 0.1 0.0 - 0.2 10e9/L    Diff Method Automated Method    Comprehensive metabolic panel   Result Value Ref Range    Sodium 137 133 - 144 mmol/L    Potassium 4.1 3.4 - 5.3 mmol/L    Chloride 107 94 - 109 mmol/L    Carbon Dioxide  "27 20 - 32 mmol/L    Anion Gap 3 3 - 14 mmol/L    Glucose 97 70 - 99 mg/dL    Urea Nitrogen 14 7 - 30 mg/dL    Creatinine 0.73 0.52 - 1.04 mg/dL    GFR Estimate >90 >60 mL/min/[1.73_m2]    GFR Estimate If Black >90 >60 mL/min/[1.73_m2]    Calcium 9.3 8.5 - 10.1 mg/dL    Bilirubin Total 0.5 0.2 - 1.3 mg/dL    Albumin 4.0 3.4 - 5.0 g/dL    Protein Total 7.8 6.8 - 8.8 g/dL    Alkaline Phosphatase 52 40 - 150 U/L    ALT 25 0 - 50 U/L    AST 18 0 - 45 U/L       ASSESSMENT/PLAN:   Lay was seen today for physical.    Diagnoses and all orders for this visit:    Routine general medical examination at a health care facility    Screen for colon cancer  -     Fecal colorectal cancer screen FIT - Future (S+30); Future    Special screening for malignant neoplasms, colon    Screening cholesterol level    Palpitations  -     Lipid panel reflex to direct LDL Fasting  -     TSH with free T4 reflex  -     CBC with platelets and differential  -     Comprehensive metabolic panel  -     OFFICE/OUTPT VISIT,EST,LEVL III      Colon cancer screening recommended.  Colonoscopy declined.  FIT card given.  Complete Augmentin for the felon of the right index finger  General health recommendations r reviewed  Further work-up with ongoing palpitations recommended consider ZIO Patch chest x-ray EKG  We will contact her with lab results as they become available    COUNSELING:  Reviewed preventive health counseling, as reflected in patient instructions    Estimated body mass index is 22.71 kg/m  as calculated from the following:    Height as of this encounter: 1.702 m (5' 7\").    Weight as of this encounter: 65.8 kg (145 lb).         reports that she has never smoked. She has never used smokeless tobacco.      Counseling Resources:  ATP IV Guidelines  Pooled Cohorts Equation Calculator  Breast Cancer Risk Calculator  FRAX Risk Assessment  ICSI Preventive Guidelines  Dietary Guidelines for Americans, 2010  USDA's MyPlate  ASA Prophylaxis  Lung CA " Screening  Call or return to the clinic with any worsening of symptoms or no resolution. Patient/Parent verbalized understanding and is in agreement. Medication side effects reviewed.   Current Outpatient Medications   Medication Sig Dispense Refill     amoxicillin-clavulanate (AUGMENTIN) 875-125 MG tablet Take 1 tablet by mouth 2 times daily for 10 days 20 tablet 0     Calcium Carbonate-Vitamin D (CALCIUM + D PO) Take  by mouth.       Multiple Vitamins-Minerals (DAILY MULTI VITAMIN/MINERALS PO) Take  by mouth.       TURMERIC PO        Chart documentation with Dragon Voice recognition Software. Although reviewed after completion, some words and grammatical errors may remain.  Follow-up 1 year health maintenance exam sooner with ongoing palpitations  KIRA Mendez [facility]

## 2019-10-22 NOTE — PATIENT INSTRUCTIONS
Preventive Health Recommendations  Female Ages 50 - 64    Yearly exam: See your health care provider every year in order to  o Review health changes.   o Discuss preventive care.    o Review your medicines if your doctor has prescribed any.      Get a Pap test every three years (unless you have an abnormal result and your provider advises testing more often).    If you get Pap tests with HPV test, you only need to test every 5 years, unless you have an abnormal result.     You do not need a Pap test if your uterus was removed (hysterectomy) and you have not had cancer.    You should be tested each year for STDs (sexually transmitted diseases) if you're at risk.     Have a mammogram every 1 to 2 years.    Have a colonoscopy at age 50, or have a yearly FIT test (stool test). These exams screen for colon cancer.      Have a cholesterol test every 5 years, or more often if advised.    Have a diabetes test (fasting glucose) every three years. If you are at risk for diabetes, you should have this test more often.     If you are at risk for osteoporosis (brittle bone disease), think about having a bone density scan (DEXA).    Shots: Get a flu shot each year. Get a tetanus shot every 10 years.    Nutrition:     Eat at least 5 servings of fruits and vegetables each day.    Eat whole-grain bread, whole-wheat pasta and brown rice instead of white grains and rice.    Get adequate Calcium and Vitamin D.     Lifestyle    Exercise at least 150 minutes a week (30 minutes a day, 5 days a week). This will help you control your weight and prevent disease.    Limit alcohol to one drink per day.    No smoking.     Wear sunscreen to prevent skin cancer.     See your dentist every six months for an exam and cleaning.    See your eye doctor every 1 to 2 years.      Patient Education     Understanding Scoliosis    Scoliosis is a problem that makes the spine curve and twist from side to side. It is most often found in girls in their early  teens. But boys can have it, too. No one is sure what causes scoliosis. But we do know that scoliosis is not caused by things like carrying heavy bags or playing sports. If someone in your family (like a parent or a sibling) has scoliosis, you may be more likely to have it, too.  What are the signs?  The signs of scoliosis may include:    One shoulder higher than the other    One shoulder blade sticking out farther than the other    An uneven waistline    Hems hanging unevenly on you  These signs often appear slowly as you grow. You and your parents may not even notice them. For this reason, schools in many states have screening programs to check for scoliosis. These programs help find scoliosis early and keep it from causing problems later.  A note to parents  ?Here are some suggestions:    Hearing that your child has scoliosis can be upsetting. But remember that mild cases may not need treatment. If your child's scoliosis is severe or worsening, it can be treated.     Go to all your child s appointments. Ask questions and be sure you understand the healthcare provider s instructions.    Become informed about scoliosis. Try the library or do a search on the Internet.    Form a team with your child and the healthcare provider. Be your child s  and biggest fan.    Know that each child is different. Your child s healthcare provider will choose the treatment that is best for your child s spine.   Date Last Reviewed: 5/1/2018 2000-2018 [employer]. [address] [place] [address], [place] [address]. All rights reserved. This information is not intended as a substitute for professional medical care. Always follow your healthcare professional's instructions.

## 2019-11-01 ENCOUNTER — OFFICE VISIT (OUTPATIENT)
Dept: PSYCHOLOGY | Facility: CLINIC | Age: 54
End: 2019-11-01
Payer: COMMERCIAL

## 2019-11-01 DIAGNOSIS — F43.23 ADJUSTMENT DISORDER WITH MIXED ANXIETY AND DEPRESSED MOOD: Primary | ICD-10-CM

## 2019-11-01 PROCEDURE — 90834 PSYTX W PT 45 MINUTES: CPT | Performed by: PSYCHOLOGIST

## 2019-11-06 ENCOUNTER — HEALTH MAINTENANCE LETTER (OUTPATIENT)
Age: 54
End: 2019-11-06

## 2019-11-13 ENCOUNTER — OFFICE VISIT (OUTPATIENT)
Dept: PSYCHOLOGY | Facility: CLINIC | Age: 54
End: 2019-11-13
Payer: COMMERCIAL

## 2019-11-13 DIAGNOSIS — F43.23 ADJUSTMENT DISORDER WITH MIXED ANXIETY AND DEPRESSED MOOD: Primary | ICD-10-CM

## 2019-11-13 PROCEDURE — 90791 PSYCH DIAGNOSTIC EVALUATION: CPT | Performed by: PSYCHOLOGIST

## 2019-11-13 NOTE — Clinical Note
She seems to be going through a lot right now.  I am a have her  in for a joint session.  I do not think medications are required.  Thank you for the referral.
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
## 2019-11-22 ENCOUNTER — OFFICE VISIT (OUTPATIENT)
Dept: PSYCHOLOGY | Facility: CLINIC | Age: 54
End: 2019-11-22
Payer: COMMERCIAL

## 2019-11-22 DIAGNOSIS — F43.23 ADJUSTMENT DISORDER WITH MIXED ANXIETY AND DEPRESSED MOOD: Primary | ICD-10-CM

## 2019-11-22 PROCEDURE — 90834 PSYTX W PT 45 MINUTES: CPT | Performed by: PSYCHOLOGIST

## 2019-11-22 ASSESSMENT — ANXIETY QUESTIONNAIRES
4. TROUBLE RELAXING: SEVERAL DAYS
2. NOT BEING ABLE TO STOP OR CONTROL WORRYING: MORE THAN HALF THE DAYS
7. FEELING AFRAID AS IF SOMETHING AWFUL MIGHT HAPPEN: SEVERAL DAYS
1. FEELING NERVOUS, ANXIOUS, OR ON EDGE: NEARLY EVERY DAY
3. WORRYING TOO MUCH ABOUT DIFFERENT THINGS: SEVERAL DAYS
GAD7 TOTAL SCORE: 11
5. BEING SO RESTLESS THAT IT IS HARD TO SIT STILL: MORE THAN HALF THE DAYS
6. BECOMING EASILY ANNOYED OR IRRITABLE: SEVERAL DAYS

## 2019-11-22 ASSESSMENT — PATIENT HEALTH QUESTIONNAIRE - PHQ9: SUM OF ALL RESPONSES TO PHQ QUESTIONS 1-9: 9

## 2019-11-22 NOTE — PROGRESS NOTES
Progress Note  Disclaimer: This note consists of symbols derived from keyboarding, dictation and/or voice recognition software. As a result, there may be errors in the script that have gone undetected. Please consider this when interpreting information found in this chart.    Patient Name: Lay Mccain  Date: 11/22/2019         Service Type: Couple  Video Visit: No     Session Start Time: 12:00 PM  session End Time: 12:45 PM     Session Length: 45    Session #: 3    Attendees: Client and Spouse / Significant Other     Treatment Plan Last Reviewed: 11/22/2019  PHQ-9 / APRIL-7 : See flow sheets    DATA  Interactive Complexity: No  Crisis: No       Progress Since Last Session (Related to Symptoms / Goals / Homework):   Symptoms: Worsening Marital relationship deteriorating    Homework: N/A      Episode of Care Goals: Satisfactory progress - PREPARATION (Decided to change - considering how); Intervened by negotiating a change plan and determining options / strategies for behavior change, identifying triggers, exploring social supports, and working towards setting a date to begin behavior change     Current / Ongoing Stressors and Concerns:  Client's presenting problem include marital stress due to 's difficulty managing his anger better and has has been a repeating pattern throughout the 32 years of the marriage, however it is beginning to affect their 15-year-old daughter.  Client does not feel endangered in her home environment but acknowledges that his anger is affecting her emotionally. Client stated that her symptoms have resulted in the following functional impairments: relationship(s) and social interactions    Treatment Objective(s) Addressed in This Session:   identify patterns of escalation  (i.e. tightness in chest, flushed face, increased heart rate, clenched hands, etc.)  Exam possibility of marital therapy     Intervention:   Motivational Interviewing: To  each of them wanted out of the relationship, but did not feel they are not getting, examined communication patterns within the relationship        ASSESSMENT: Current Emotional / Mental Status (status of significant symptoms):   Risk status (Self / Other harm or suicidal ideation)   Patient denies current fears or concerns for personal safety.   Patient denies current or recent suicidal ideation or behaviors.   Patientdenies current or recent homicidal ideation or behaviors.   Patient denies current or recent self injurious behavior or ideation.   Patient denies other safety concerns.   Patient Patient reports there has been no change in risk factors since their last session.     PatientPatient reports there has been no change in protective factors since their last session.     Recommended that patient call 911 or go to the local ED should there be a change in any of these risk factors.     Appearance:   Appropriate    Eye Contact:   Poor   Psychomotor Behavior: Restless    Attitude:   Guarded    Orientation:   All   Speech    Rate / Production: Normal     Volume:  Soft    Mood:    Anxious    Affect:    Constricted    Thought Content:  Clear    Thought Form:  Coherent  Logical    Insight:    Good      Medication Review:   No current psychiatric medications prescribed     Medication Compliance:   NA     Changes in Health Issues:   None reported     Chemical Use Review:   Substance Use: Chemical use reviewed, no active concerns identified      Tobacco Use: No current tobacco use.      Diagnosis:  No diagnosis found.    Collateral Reports Completed:   Not Applicable    PLAN: (Patient Tasks / Therapist Tasks / Other)  Referral to Demetrius Carson and John Mo for marital therapy.  Continue individual therapy with client around safety and anxiety.  Recommended individual therapy for  about his anxiety and possible depression.        Lizandro Talbot                                                          ______________________________________________________________________    Treatment Plan    Patient's Name: Lay Mccain  YOB: 1965    Date: 11/22/2019       DSM5 Diagnoses: (Sustained by DSM5 Criteria Listed Above)  Diagnoses:  Adjustment Disorders  309.28 (F43.23) With mixed anxiety and depressed mood  Psychosocial & Contextual Factors: Rule out PTSD.  Rule out depression.  WHODAS 2.0 (12 item)                This questionnaire asks about difficulties due to health conditions. Health conditions         include  disease or illnesses, other health problems that may be short or long lasting,      injuries, mental health or emotional problems, and problems with alcohol or drugs.                   Think back over the past 30 days and answer these questions, thinking about how much    difficulty you had doing the following activities. For each question, please [place] only     one response.     S1 Standing for long periods such as 30 minutes? None =         1   S2 Taking care of household responsibilities? None =         1   S3 Learning a new task, for example, learning how to get to a new place? None =         1   S4 How much of a problem do you have joining community activities (for example, festivals, Denominational or other activities) in the same way as anyone else can? None =         1   S5 How much have you been emotionally affected by your health problems? None =         1              In the past 30 days, how much difficulty did you have in:   S6 Concentrating on doing something for ten minutes? None =         1   S7 Walking a long distance such as a kilometer (or equivalent)? None =         1   S8 Washing your whole body? None =         1   S9 Getting dressed? None =         1   S10 Dealing with people you do not know? None =         1   S11 Maintaining a friendship? None =         1   S12 Your day to day work? None =         1      H1 Overall, in the past 30 days, how many days were these  difficulties present? Record number of days 0   H2 In the past 30 days, for how many days were you totally unable to carry out your usual activities or work because of any health condition? Record number of days  0   H3 In the past 30 days, not counting the days that you were totally unable, for how many days did you cut back or reduce your usual activities or work because of any health condition? Record number of days 0          Referral / Collaboration:  The following referral(s) will be initiated: Marital therapy.    Anticipated number of session or this episode of care: 25    Anxiety Treatment plan:  1. Education- the Biopsychosocial model of anxiety  a. Client will be able to describe how anxiety is effecting them physically, emotionally and socially  2. Distraction  a. Client will learn 2 techniques to distract themselves when becoming anxious  3. Diaphragmatic breathing  a. Client will learn to breath using their diaphragm  b. Client will learn 2 breathing patterns to use to reduce anxiety  4. Visualization  a. Client will learn to establish a safe, calm place in their mind utilizing all their senses  b. Client will practice using visualization at times when they are not anxious so they will be able to use it when anxiety occurs  5. Progressive muscle relaxation  a. Client will learn progressive muscle relaxation techniques and practice them 4-5 times per week.  b. Client will learn to use mental images of relaxation to relax muscle groups and do this on a daily basis  6. Self-care  a. Client will identify 3 things they can do just for themselves  b. Client will take time for quiet, reflection, meditation 3 times per week  c. Client will Learn to set boundaries when appropriate  d. Client will Identify 3 individuals they can call on for support, distraction  7. Assessment of progress  a. Client will engage in assessment of their progress on a regular basis    Patient has reviewed and agreed to the above  plan.      Lizandro Talbot  November 22, 2019

## 2019-11-23 ASSESSMENT — ANXIETY QUESTIONNAIRES: GAD7 TOTAL SCORE: 11

## 2019-12-17 ENCOUNTER — HOSPITAL ENCOUNTER (OUTPATIENT)
Dept: MAMMOGRAPHY | Facility: CLINIC | Age: 54
Discharge: HOME | End: 2019-12-17
Attending: FAMILY MEDICINE | Admitting: FAMILY MEDICINE
Payer: COMMERCIAL

## 2019-12-17 DIAGNOSIS — Z12.31 VISIT FOR SCREENING MAMMOGRAM: ICD-10-CM

## 2019-12-17 PROCEDURE — 77067 SCR MAMMO BI INCL CAD: CPT

## 2020-11-29 ENCOUNTER — HEALTH MAINTENANCE LETTER (OUTPATIENT)
Age: 55
End: 2020-11-29

## 2020-12-11 ENCOUNTER — OFFICE VISIT (OUTPATIENT)
Dept: FAMILY MEDICINE | Facility: CLINIC | Age: 55
End: 2020-12-11
Payer: COMMERCIAL

## 2020-12-11 VITALS
WEIGHT: 160 LBS | DIASTOLIC BLOOD PRESSURE: 80 MMHG | HEIGHT: 67 IN | TEMPERATURE: 98.4 F | HEART RATE: 72 BPM | BODY MASS INDEX: 25.11 KG/M2 | RESPIRATION RATE: 16 BRPM | SYSTOLIC BLOOD PRESSURE: 122 MMHG

## 2020-12-11 DIAGNOSIS — Z12.11 SCREENING FOR COLON CANCER: ICD-10-CM

## 2020-12-11 DIAGNOSIS — Z00.00 ROUTINE GENERAL MEDICAL EXAMINATION AT A HEALTH CARE FACILITY: Primary | ICD-10-CM

## 2020-12-11 DIAGNOSIS — N95.0 POSTMENOPAUSAL BLEEDING: ICD-10-CM

## 2020-12-11 PROCEDURE — 99213 OFFICE O/P EST LOW 20 MIN: CPT | Mod: 25 | Performed by: NURSE PRACTITIONER

## 2020-12-11 PROCEDURE — 99396 PREV VISIT EST AGE 40-64: CPT | Performed by: NURSE PRACTITIONER

## 2020-12-11 ASSESSMENT — ENCOUNTER SYMPTOMS
HEMATOCHEZIA: 0
BREAST MASS: 0
HEMATURIA: 0
DIARRHEA: 0
CHILLS: 0
CONSTIPATION: 0
NAUSEA: 0
MYALGIAS: 0
PALPITATIONS: 0
HEARTBURN: 0
SORE THROAT: 0
HEADACHES: 0
PARESTHESIAS: 0
COUGH: 0
DYSURIA: 0
DIZZINESS: 0
ARTHRALGIAS: 0
JOINT SWELLING: 0
FREQUENCY: 0
ABDOMINAL PAIN: 0
NERVOUS/ANXIOUS: 0
WEAKNESS: 0
FEVER: 0
SHORTNESS OF BREATH: 0
EYE PAIN: 0

## 2020-12-11 ASSESSMENT — MIFFLIN-ST. JEOR: SCORE: 1358.39

## 2020-12-11 NOTE — PROGRESS NOTES
SUBJECTIVE:   CC: Lay Mccain is an 55 year old woman who presents for preventive health visit.       Patient has been advised of split billing requirements and indicates understanding: Yes  Healthy Habits:     Getting at least 3 servings of Calcium per day:  Yes    Bi-annual eye exam:  Yes    Dental care twice a year:  NO    Sleep apnea or symptoms of sleep apnea:  None    Diet:  Regular (no restrictions)    Frequency of exercise:  6-7 days/week    Duration of exercise:  30-45 minutes    Taking medications regularly:  Yes    Medication side effects:  Not applicable    PHQ-2 Total Score: 0    Additional concerns today:  Yes    Menstrual Concern  Onset/Duration: 2 times - last week  Description: pink tinge with whipping twice after menopause   History:  Patient's last menstrual period was 11/29/2013.  Previous normal periods: YES  Sexually active: YES  Any bleeding after intercourse: no  Abnormal PAP Smears: YES - NIL with EM cells 12/19/2013  Therapies tried and outcome: None    Menopause age 51   Some bleeding pink tinged with whiping this week  H/o uterine fibroids.  Previous endometrial biopsy x 2 negative  None recently.       Today's PHQ-2 Score:   PHQ-2 ( 1999 [employer]) 12/11/2020   Q1: Little interest or pleasure in doing things 0   Q2: Feeling down, depressed or hopeless 0   PHQ-2 Score 0   Q1: Little interest or pleasure in doing things Not at all   Q2: Feeling down, depressed or hopeless Not at all   PHQ-2 Score 0       Abuse: Current or Past (Physical, Sexual or Emotional) - No  Do you feel safe in your environment? Yes    Lab Results   Component Value Date    PAP NIL 08/07/2018         Social History     Tobacco Use     Smoking status: Never Smoker     Smokeless tobacco: Never Used   Substance Use Topics     Alcohol use: Yes     Comment: Avg. 5 drinks per week         Alcohol Use 12/11/2020   Prescreen: >3 drinks/day or >7 drinks/week? No   Prescreen: >3 drinks/day or >7 drinks/week? -       Reviewed  orders with patient.  Reviewed health maintenance and updated orders accordingly - Yes  FLU SHOT DECLINED  MAMMOGRAM DECLINED DOING EVERY 2 YRS.  FAMILY HISTORY PATERNAL AUNT    Patient Active Problem List   Diagnosis     CARDIOVASCULAR SCREENING; LDL GOAL LESS THAN 160     Vulvar discomfort     Premature atrial contractions     Annual physical exam     Left ankle swelling     Abnormal antinuclear antibody titer     Generalized anxiety disorder     Abnormal Pap smear of cervix     Past Surgical History:   Procedure Laterality Date     NO HISTORY OF SURGERY  11/2011       Social History     Tobacco Use     Smoking status: Never Smoker     Smokeless tobacco: Never Used   Substance Use Topics     Alcohol use: Yes     Comment: Avg. 5 drinks per week     Family History   Problem Relation Age of Onset     Heart Disease Mother         bicuspid aortic valve     Hypertension Mother      Cerebrovascular Disease Maternal Grandmother      Heart Disease Maternal Grandmother      Cerebrovascular Disease Maternal Grandfather      Heart Disease Maternal Grandfather      Heart Disease Paternal Grandfather      Cancer Brother         Passed away 12/2014           Mammogram Screening: Patient over age 50, mutual decision to screen reflected in health maintenance.    Pertinent mammograms are reviewed under the imaging tab.  History of abnormal Pap smear: NO - age 30-65 PAP every 5 years with negative HPV co-testing recommended  PAP / HPV Latest Ref Rng & Units 8/7/2018 2/15/2016 12/23/2014   PAP - NIL NIL NIL   HPV 16 DNA NEG:Negative Negative Negative -   HPV 18 DNA NEG:Negative Negative Negative -   OTHER HR HPV NEG:Negative Negative Negative -     Reviewed and updated as needed this visit by clinical staff                 Reviewed and updated as needed this visit by Provider                Past Medical History:   Diagnosis Date     ASCUS favor benign 2012    -HPV per [facility] rpt cotesting 3 yrs: 2015     Vulvodynia       Past Surgical  History:   Procedure Laterality Date     NO HISTORY OF SURGERY  2011     OB History    Para Term  AB Living   2 1 1 0 1 1   SAB TAB Ectopic Multiple Live Births   1 0 0 0 1      # Outcome Date GA Lbr Bryant/2nd Weight Sex Delivery Anes PTL Lv   2 2007     SAB   DEC    Term 04 40w0d  3.856 kg (8 lb 8 oz) F    GWENDOLYN       Review of Systems   Constitutional: Negative for chills and fever.   HENT: Negative for congestion, ear pain, hearing loss and sore throat.    Eyes: Negative for pain and visual disturbance.   Respiratory: Negative for cough and shortness of breath.    Cardiovascular: Negative for chest pain, palpitations and peripheral edema.   Gastrointestinal: Negative for abdominal pain, constipation, diarrhea, heartburn, hematochezia and nausea.   Breasts:  Negative for tenderness, breast mass and discharge.   Genitourinary: Negative for dysuria, frequency, genital sores, hematuria, pelvic pain, urgency and vaginal discharge.   Musculoskeletal: Negative for arthralgias, joint swelling and myalgias.   Skin: Negative for rash.   Neurological: Negative for dizziness, weakness, headaches and paresthesias.   Psychiatric/Behavioral: Negative for mood changes. The patient is not nervous/anxious.           OBJECTIVE:   LMP 2013   Physical Exam  GENERAL APPEARANCE: healthy, alert and no distress  EYES: Eyes grossly normal to inspection, PERRL and conjunctivae and sclerae normal  HENT: ear canals and TM's normal, nose and mouth without ulcers or lesions, oropharynx clear and oral mucous membranes moist  NECK: no adenopathy, no asymmetry, masses, or scars and thyroid normal to palpation  RESP: lungs clear to auscultation - no rales, rhonchi or wheezes  BREAST: normal without masses, tenderness or nipple discharge and no palpable axillary masses or adenopathy  CV: regular rate and rhythm, normal S1 S2, no S3 or S4, no murmur, click or rub, no peripheral edema and peripheral pulses  "strong  ABDOMEN: soft, nontender, no hepatosplenomegaly, no masses and bowel sounds normal  MS: no musculoskeletal defects are noted and gait is age appropriate without ataxia  SKIN: no suspicious lesions or rashes  NEURO: Normal strength and tone, sensory exam grossly normal, mentation intact and speech normal  PSYCH: mentation appears normal and affect normal/bright    Diagnostic Test Results:  Labs reviewed in Epic    ASSESSMENT/PLAN:   Lay was seen today for physical.    Diagnoses and all orders for this visit:    Routine general medical examination at a health care facility    Screening for colon cancer  -     Fecal colorectal cancer screen (FIT); Future    Postmenopausal bleeding  -     OB/GYN REFERRAL  -     US Pelvic Complete with Transvaginal; Future      Pelvic US  GYN consultation regarding DUB  Consider endometrial biopsy  Colon cancer screening colonoscopy declined. FIT cards to be mailed as they become available.   Flu shot offered and declined  Mammogram declined this year.     Patient has been advised of split billing requirements and indicates understanding: Yes  COUNSELING:  Reviewed preventive health counseling, as reflected in patient instructions    Estimated body mass index is 22.71 kg/m  as calculated from the following:    Height as of 10/22/19: 1.702 m (5' 7\").    Weight as of 10/22/19: 65.8 kg (145 lb).        She reports that she has never smoked. She has never used smokeless tobacco.      Counseling Resources:  ATP IV Guidelines  Pooled Cohorts Equation Calculator  Breast Cancer Risk Calculator  BRCA-Related Cancer Risk Assessment: FHS-7 Tool  FRAX Risk Assessment  ICSI Preventive Guidelines  Dietary Guidelines for Americans, 2010  USDA's MyPlate  ASA Prophylaxis  Lung CA Screening  Call or return to the clinic with any worsening of symptoms or no resolution. Patient/Parent verbalized understanding and is in agreement. Medication side effects reviewed.   Current Outpatient Medications "   Medication Sig Dispense Refill     Calcium Carbonate-Vitamin D (CALCIUM + D PO) Take  by mouth.       Multiple Vitamins-Minerals (DAILY MULTI VITAMIN/MINERALS PO) Take  by mouth.       TURMERIC PO        Chart documentation with Dragon Voice recognition Software. Although reviewed after completion, some words and grammatical errors may remain.      KIRA Mendez [facility]

## 2020-12-13 ENCOUNTER — HOSPITAL ENCOUNTER (OUTPATIENT)
Dept: ULTRASOUND IMAGING | Facility: CLINIC | Age: 55
Discharge: HOME | End: 2020-12-13
Attending: NURSE PRACTITIONER | Admitting: NURSE PRACTITIONER
Payer: COMMERCIAL

## 2020-12-13 DIAGNOSIS — N95.0 POSTMENOPAUSAL BLEEDING: ICD-10-CM

## 2020-12-13 PROCEDURE — 76830 TRANSVAGINAL US NON-OB: CPT

## 2020-12-16 ENCOUNTER — OFFICE VISIT (OUTPATIENT)
Dept: OBGYN | Facility: CLINIC | Age: 55
End: 2020-12-16
Attending: NURSE PRACTITIONER
Payer: COMMERCIAL

## 2020-12-16 VITALS
HEART RATE: 88 BPM | DIASTOLIC BLOOD PRESSURE: 83 MMHG | HEIGHT: 67 IN | RESPIRATION RATE: 18 BRPM | WEIGHT: 160 LBS | TEMPERATURE: 97.1 F | SYSTOLIC BLOOD PRESSURE: 142 MMHG | BODY MASS INDEX: 25.11 KG/M2

## 2020-12-16 DIAGNOSIS — N95.0 PMB (POSTMENOPAUSAL BLEEDING): Primary | ICD-10-CM

## 2020-12-16 PROCEDURE — 88305 TISSUE EXAM BY PATHOLOGIST: CPT | Performed by: PATHOLOGY

## 2020-12-16 PROCEDURE — 58100 BIOPSY OF UTERUS LINING: CPT | Performed by: OBSTETRICS & GYNECOLOGY

## 2020-12-16 PROCEDURE — 99203 OFFICE O/P NEW LOW 30 MIN: CPT | Mod: 25 | Performed by: OBSTETRICS & GYNECOLOGY

## 2020-12-16 ASSESSMENT — MIFFLIN-ST. JEOR: SCORE: 1358.39

## 2020-12-16 NOTE — PROGRESS NOTES
"Lay is a 55 year old   female who presents for consult as requested by Carla Carrasco; Lay went through menopause around age 51; she reportys in past 2 weeks, she has had 2 episodes of very scant pink colored bleeding when wiping; one asociated with some cramping.  She has had prior ultrasound in 2016, which was reviewed, and showed 2 fibroids 8 mm EMS; at that time ENDOMETRIAL BIOPSY was benign.  A more recent ultrasound 20 shows the fibroids have decreased in size and the EMS is 5 mm, although described as \"mildly abnormally thickened for a postmenopausal female not on HRT\".  She presents for advice about next steps..    Patient Active Problem List    Diagnosis Date Noted     Abnormal Pap smear of cervix 2013     Priority: Medium     2012 Pap - ASCUS, Neg HPV.   13: Pap - NIL, Neg HPV with endometrial cells present. EMB - neg. Repeat pap in 1 year.   2014 negative endometrial biopsy       Generalized anxiety disorder 10/08/2013     Priority: Medium     Diagnosis updated by automated process. Provider to review and confirm.       Abnormal antinuclear antibody titer 2013     Priority: Medium     Annual physical exam 2012     Priority: Medium     Left ankle swelling 2012     Priority: Medium     Xray  ra labs  Rheumatology consult       Premature atrial contractions 2012     Priority: Medium     Vulvar discomfort 2011     Priority: Medium     Trial of clobetasol  If no resolution point injection  Possible repeat biopsy/colposcopy of area-left labia-  12/15/11  The pathology from the vulvar biopsy came back showing possible signs of the HPV virus. I would like her to return to see one of the GYN physicians for further evaluation and possible re-biopsy. This is nothing horribly urgent, in the next few months would be fine-  Cherelle Londono, MSN,CNP  2/10/12 neg hpv  12 neg hpv         CARDIOVASCULAR SCREENING; LDL GOAL LESS THAN 160 10/31/2010 "     Priority: Medium       All systems were reviewed and pertinent information in noted in subjective/HPI.    Past Medical History:   Diagnosis Date     ASCUS favor benign 2012    -HPV per [facility] rpt cotesting 3 yrs: 2015     Vulvodynia        Past Surgical History:   Procedure Laterality Date     NO HISTORY OF SURGERY  11/2011         Current Outpatient Medications:      Calcium Carbonate-Vitamin D (CALCIUM + D PO), Take  by mouth., Disp: , Rfl:      Multiple Vitamins-Minerals (DAILY MULTI VITAMIN/MINERALS PO), Take  by mouth., Disp: , Rfl:      TURMERIC PO, , Disp: , Rfl:     ALLERGIES:  Clindamycin and Vicodin [hydrocodone-acetaminophen]    Social History     Socioeconomic History     Marital status:      Spouse name: None     Number of children: 1     Years of education: None     Highest education level: None   Occupational History     Occupation: Stay at Home Mom     Employer: NONE    Social Needs     Financial resource strain: None     Food insecurity     Worry: None     Inability: None     Transportation needs     Medical: None     Non-medical: None   Tobacco Use     Smoking status: Never Smoker     Smokeless tobacco: Never Used   Substance and Sexual Activity     Alcohol use: Yes     Comment: Avg. 5 drinks per week     Drug use: No     Sexual activity: Yes     Partners: Male     Birth control/protection: Condom   Lifestyle     Physical activity     Days per week: None     Minutes per session: None     Stress: None   Relationships     Social connections     Talks on phone: None     Gets together: None     Attends Spiritism service: None     Active member of club or organization: None     Attends meetings of clubs or organizations: None     Relationship status: None     Intimate partner violence     Fear of current or ex partner: None     Emotionally abused: None     Physically abused: None     Forced sexual activity: None   Other Topics Concern     Parent/sibling w/ CABG, MI or angioplasty before 65F  "55M? Yes     Comment: MGM, MGF heart attackes.  maternal uncle fatal MI at 51yo   Social History Narrative     None       Family History   Problem Relation Age of Onset     Heart Disease Mother         bicuspid aortic valve     Hypertension Mother      Cerebrovascular Disease Maternal Grandmother      Heart Disease Maternal Grandmother      Cerebrovascular Disease Maternal Grandfather      Heart Disease Maternal Grandfather      Heart Disease Paternal Grandfather      Cancer Brother         Passed away 12/2014       OBJECTIVE:  Vitals: BP (!) 142/83 (BP Location: Right arm, Patient Position: Chair, Cuff Size: Adult Regular)   Pulse 88   Temp 97.1  F (36.2  C) (Tympanic)   Resp 18   Ht 1.702 m (5' 7\")   Wt 72.6 kg (160 lb)   LMP 11/29/2013   BMI 25.06 kg/m   BMI= Body mass index is 25.06 kg/m .   Patient's last menstrual period was 11/29/2013.     GENERAL APPEARANCE: healthy, alert and no distress  ABDOMEN:  soft, nontender, no hepato-splenomegaly or hernias   PELVIC:  EGBUS normal, vagina well estrogenized and supported, no unusual discharge, cervix grossly normal, PAP not taken, uterus normal in size and configuration, adnexa non-tender and not enlarged,    The cervix was visualized with the speculum, and cleansed with an iodine solution.  The anterior cervix was grasped with a tenaculum and a pipelle advanced into the uterine cavity, with a sounded depth of 8 cm.  A representative sample was aspirated from the cavity and sent for pathological examination.      ASSESSMENT:      ICD-10-CM    1. PMB (postmenopausal bleeding)  N95.0        PLAN:  I suspect the bleeding is endometrial atrophy overlying shrinking fibroids;  If the biopsy does not show any hyperplasia or polyps, then I suggest monitoring only at this time.  Lay is very comfortable with this plan  Carmen Scott MD  [facility]    C: Dr. Hong Scott MD    "

## 2020-12-16 NOTE — NURSING NOTE
"Initial BP (!) 142/83 (BP Location: Right arm, Patient Position: Chair, Cuff Size: Adult Regular)   Pulse 88   Temp 97.1  F (36.2  C) (Tympanic)   Resp 18   Ht 1.702 m (5' 7\")   Wt 72.6 kg (160 lb)   LMP 11/29/2013   BMI 25.06 kg/m   Estimated body mass index is 25.06 kg/m  as calculated from the following:    Height as of this encounter: 1.702 m (5' 7\").    Weight as of this encounter: 72.6 kg (160 lb). .      "

## 2020-12-18 LAB — COPATH REPORT: NORMAL

## 2021-03-24 ENCOUNTER — ANCILLARY PROCEDURE (OUTPATIENT)
Dept: GENERAL RADIOLOGY | Facility: CLINIC | Age: 56
End: 2021-03-24
Attending: NURSE PRACTITIONER
Payer: COMMERCIAL

## 2021-03-24 ENCOUNTER — OFFICE VISIT (OUTPATIENT)
Dept: FAMILY MEDICINE | Facility: CLINIC | Age: 56
End: 2021-03-24
Payer: COMMERCIAL

## 2021-03-24 VITALS
HEART RATE: 78 BPM | BODY MASS INDEX: 24.28 KG/M2 | WEIGHT: 155 LBS | TEMPERATURE: 97.4 F | SYSTOLIC BLOOD PRESSURE: 110 MMHG | DIASTOLIC BLOOD PRESSURE: 70 MMHG | RESPIRATION RATE: 16 BRPM

## 2021-03-24 DIAGNOSIS — M41.34 THORACOGENIC SCOLIOSIS OF THORACIC REGION: ICD-10-CM

## 2021-03-24 DIAGNOSIS — M62.838 MUSCLE SPASMS OF NECK: ICD-10-CM

## 2021-03-24 DIAGNOSIS — M62.830 SPASM OF THORACIC BACK MUSCLE: Primary | ICD-10-CM

## 2021-03-24 DIAGNOSIS — M62.830 SPASM OF THORACIC BACK MUSCLE: ICD-10-CM

## 2021-03-24 DIAGNOSIS — M54.2 CERVICALGIA: ICD-10-CM

## 2021-03-24 PROCEDURE — 72040 X-RAY EXAM NECK SPINE 2-3 VW: CPT | Performed by: RADIOLOGY

## 2021-03-24 PROCEDURE — 99213 OFFICE O/P EST LOW 20 MIN: CPT | Performed by: NURSE PRACTITIONER

## 2021-03-24 PROCEDURE — 72070 X-RAY EXAM THORAC SPINE 2VWS: CPT | Performed by: RADIOLOGY

## 2021-03-24 RX ORDER — MULTIVITAMIN WITH IRON
1 TABLET ORAL DAILY
COMMUNITY
Start: 2020-01-01

## 2021-03-24 ASSESSMENT — PAIN SCALES - GENERAL: PAINLEVEL: MODERATE PAIN (4)

## 2021-03-24 NOTE — NURSING NOTE
"Chief Complaint   Patient presents with     Back Pain     left upper pain       Initial /70 (BP Location: Right arm, Patient Position: Chair, Cuff Size: Adult Regular)   Pulse 78   Temp 97.4  F (36.3  C) (Tympanic)   Resp 16   Wt 70.3 kg (155 lb)   LMP 11/29/2013   .2 cm (67\")   Breastfeeding No   BMI 24.28 kg/m   Estimated body mass index is 24.28 kg/m  as calculated from the following:    Height as of 12/16/20: 1.702 m (5' 7\").    Weight as of this encounter: 70.3 kg (155 lb).    Patient presents to the clinic using No DME    Health Maintenance that is potentially due pending provider review:  Mammogram    Pt declines to have mammogram.    Is there anyone who you would like to be able to receive your results? No  If yes have patient fill out ALBERT  Nataliya Barron CMA    "

## 2021-03-24 NOTE — PROGRESS NOTES
Assessment & Plan     Spasm of thoracic back muscle  - XR Thoracic Spine 2 Views; Future  - Orthopedic & Spine  Referral; Future    Muscle spasms of neck  - XR Cervical Spine 2/3 Views; Future  - Orthopedic & Spine  Referral; Future    Thoracogenic scoliosis of thoracic region  Mild curvature with left scapular rise  - Orthopedic & Spine  Referral; Future    Cervicalgia  - Orthopedic & Spine  Referral; Future      Topical salan pas  Topical aspercream  Tylenol up to 3500 mg daily   Offered muscle relaxer declined   Xray of c spine and thoracic spine today   Mammogram yearly       20 minutes spent on the date of the encounter doing chart review, history and exam, documentation and further activities as noted above    Call or return to the clinic with any worsening of symptoms or no resolution. Patient/Parent verbalized understanding and is in agreement. Medication side effects reviewed.   Current Outpatient Medications   Medication Sig Dispense Refill     Calcium Carbonate-Vitamin D (CALCIUM + D PO) Take  by mouth.       magnesium 250 MG tablet Take 1 tablet by mouth daily       Multiple Vitamins-Minerals (DAILY MULTI VITAMIN/MINERALS PO) Take  by mouth.       TURMERIC PO        Chart documentation with Dragon Voice recognition Software. Although reviewed after completion, some words and grammatical errors may remain.    KIRA Mendez CNP  [facility]    Lucrecia Chun is a 55 year old who presents for the following health issues     HPI     Back Pain  Onset/Duration: 5 years intermittent- but now having constant pain left shoulder blade  Last time she saw the chiropractor was a year and a half ago.  Description:   Location of pain: upper back left, neck left and shoulders left  Character of pain: burning and waxing and waning  Pain radiation: none and left breast  New numbness or weakness in legs, not attributed to pain: YES- left  "arm and hand  Intensity: Currently 4/10, At its worst 8/10  Progression of Symptoms: worsening and waxing and waning  History:   Specific cause: none  Pain interferes with job:  no   History of back problems: scoliosis  Any previous MRI or X-rays: Yes- at [place].  Date neck xrays - chiropractor  Sees a specialist for back pain: No  Alleviating factors:   Improved by: heat    Precipitating factors:  Worsened by: Nothing  Therapies tried and outcome: chiropractor, heat and Physical Therapy    Accompanying Signs & Symptoms:  Risk of Fracture: None  Risk of Cauda Equina: None  Risk of Infection: None  Risk of Cancer: None  Risk of Ankylosing Spondylitis: Onset at age <35, male, AND morning back stiffness  no             Review of Systems   Constitutional, HEENT, cardiovascular, pulmonary, GI, , musculoskeletal, neuro, skin, endocrine and psych systems are negative, except as otherwise noted.      Objective    /70 (BP Location: Right arm, Patient Position: Chair, Cuff Size: Adult Regular)   Pulse 78   Temp 97.4  F (36.3  C) (Tympanic)   Resp 16   Wt 70.3 kg (155 lb)   LMP 11/29/2013   .2 cm (67\")   Breastfeeding No   BMI 24.28 kg/m    Body mass index is 24.28 kg/m .  Physical Exam   GENERAL: healthy, alert and no distress  EYES: Eyes grossly normal to inspection, PERRL and conjunctivae and sclerae normal  HENT: ear canals and TM's normal, nose and mouth without ulcers or lesions  NECK: no adenopathy, no asymmetry, masses, or scars and thyroid normal to palpation  RESP: lungs clear to auscultation - no rales, rhonchi or wheezes  CV: regular rate and rhythm, normal S1 S2, no S3 or S4, no murmur, click or rub, no peripheral edema and peripheral pulses strong  ABDOMEN: soft, nontender, no hepatosplenomegaly, no masses and bowel sounds normal  MS: decreased range of motion neck to the left and thoracic spine to the right, peripheral pulses normal and tenderness to palpation left posterior chest and " "left scapula. Scapula hump left with slight thoracic spine curvature.  SKIN: no suspicious lesions or rashes  NEURO: Normal strength and tone, mentation intact and speech normal  PSYCH: mentation appears normal, affect normal/bright    Office Visit on 12/16/2020   Component Date Value Ref Range Status     Copath Report 12/16/2020    Final                    Value:Patient Name: ISAAC CASTILLO  MR#: [id number]  Specimen #: [id number]  Collected: 12/16/2020  Received: 12/17/2020  Reported: 12/18/2020 09:35  Ordering Phy(s): JAH CORNEJO    For improved result formatting, select 'View Enhanced Report Format' under   Linked Documents section.    SPECIMEN(S):  Endometrial biopsy    FINAL DIAGNOSIS:  Endometrial biopsy:  - Fragment suggestive of endometrial polyp with stromal breakdown,   extravasated red blood cells and  hemosiderin-laden macrophages.  - Inactive endometrium; no evidence of hyperplasia, atypia or malignancy.    Electronically signed out by:    Jeovanny James M.D., PhD    CLINICAL HISTORY:  55 year old female.    GROSS:  The specimen is received in formalin with proper patient identification   labeled \"endometrial biopsy\".  The  specimen consists of pink spongy tissue fragments and hemorrhagic material   measuring up to 0.5 cm in  aggregate. The specimen is filtered over lens paper.  The specimen is   entirely submitted in one                           cassette.  (Dictated by: Arley Padgett 12/17/2020 02:47 PM)    MICROSCOPIC:  Microscopic examination is performed.    The technical component of this testing was completed at the [facility], with the professional component performed   at the [facility], [address] [place] [id number] ([phone])    CPT Codes:  A: 65134-CC4    COLLECTION SITE:  Client: [facility]  Location: [facility] ([facility])               "

## 2021-03-24 NOTE — PATIENT INSTRUCTIONS
Patient Education     Back Spasm (No Trauma)    Spasm of the back muscles can occur after a sudden forceful twisting or bending such as in a car accident. A spasm can also happen after a simple awkward movement, or after lifting something heavy with poor body positioning. In any case, muscle spasm adds to the pain. Sleeping in an awkward position or on a poor quality mattress can also cause this. Some people respond to emotional stress by tensing the muscles of their back.  Pain that continues may need further assessment or other types of treatment such as physical therapy.  You don't always need X-rays for the first assessment of back pain, unless you had a physical injury such as from a car accident or fall. If your pain continues and doesn't respond to medical treatment, X-rays and other tests may then be done.   Home care    As soon as possible, start sitting or walking again. This will help prevent problems from a long bed rest. These problems include muscle weakness, worsening back stiffness and pain, and blood clots in the legs.    When in bed, try to find a position of comfort. A firm mattress is best. Try lying flat on your back with pillows under your knees. You can also try lying on your side with your knees bent up toward your chest and a pillow between your knees.    Don't sit for long periods. Also limit car rides and travel. This puts more stress on the lower back than standing or walking.     During the first 24 to 72 hours after an injury or flare-up, put an ice pack on the painful area for 20 minutes, then remove it for 20 minutes. Do this over a period of 60 to 90 minutes, or several times a day. This will reduce swelling and pain. Always wrap ice packs in a thin towel.    You can start with ice, then switch to heat. Heat from a hot shower, hot bath, or heating pad reduces pain and works well for muscle spasms. Put heat on the painful area for 20 minutes, then remove it for 20 minutes. Do this  over a period of 60 to 90 minutes, or several times a day. Don't sleep on a heating pad. It can burn or damage skin.    Alternate using ice and heat.    Be aware of safe lifting methods. don't lift anything over 15 pounds until all the pain is gone.  Gentle stretching will help your back heal faster. Do this simple routine 2 to 3 times a day until your back is feeling better.    Lie on your back with your knees bent and both feet on the ground.    Slowly raise your left knee to your chest as you flatten your lower back against the floor. Hold for 20 to 30 seconds.    Relax and repeat the exercise with your right knee.    Do 2 to 3 of these exercises for each leg.    Repeat, hugging both knees to your chest at the same time.    Don't bounce, but use a gentle pull.  Medicines  Talk with your doctor before using medicine, especially if you have other medical problems or are taking other medicines.  You may use over-the-counter medicines such as acetaminophen, ibuprofen, or naprosyn to control pain, unless your healthcare provider prescribed another pain medicine. Talk with your healthcare provider if you have a chronic condition such as diabetes, liver or kidney disease, stomach ulcer, or digestive bleeding, or are taking blood thinners.  Be careful if you are given prescription pain medicine, opioids, or medicine for muscle spasm. They can cause drowsiness, and affect your coordination, reflexes, and judgment. Don't drive or operate heavy machinery when taking these medicines. Take pain medicine only as prescribed by your healthcare provider.  Follow-up care  Follow up with your doctor, or as advised. You may need physical therapy or more tests.  If X-rays were taken, they may be reviewed by a radiologist. You will be told of any new findings that may affect your care.  Call   Call if any of these occur:    Trouble breathing    Confusion    Drowsiness or trouble awakening    Fainting or loss of consciousness    Rapid  or very slow heart rate    Loss of bowel or bladder control  When to seek medical advice  Call your healthcare provider right away if any of these occur:    Pain becomes worse or spreads to your legs    Weakness or numbness in one or both legs    Numbness in the groin or genital area    Fever of 100.4 F (38 C) or higher , or as directed by your healthcare provider    Chills    Burning or pain when passing urine  [employer] last reviewed this educational content on 11/1/2018 2000-2020 [employer]. All rights reserved. This information is not intended as a substitute for professional medical care. Always follow your healthcare professional's instructions.           Patient Education     Relieving Tension in Your Back  Being relaxed helps keep your mind healthy and your back ready to move. Take short breaks often. Walk around. Stretch. Switch tasks. Also give the following a try.  Make time to relax. Start by setting aside 5 minutes daily.  Deep breathing    Deep breathing is a simple way to reduce stress. You can do it almost any time you need to relax.    Inhale slowly through your nose. Let your lungs and stomach expand.    Hold your breath for 2 to 3 seconds.    Exhale slowly through your mouth until your lungs feel empty. Repeat 3 to 4 times.  Relieve tension  Muscle tension can create tender spots called trigger points. The tips below may help relieve muscle tension.    Press the trigger point if you can reach it. If not, lie on a soft tennis ball, or ask a friend to press the spot. Use steady pressure for 10 to 15 seconds. Breathe deeply. Repeat a few times.    Massage trigger points with ice for 2 to 5 minutes. Press lightly at first. Slowly increase firmness.  [employer] last reviewed this educational content on 5/1/2018 2000-2020 [employer]. All rights reserved. This information is not intended as a substitute for professional medical care. Always follow your healthcare  professional's instructions.           Patient Education     Back Safety: Basics of Good Posture  Good posture helps protect you from injury. It also increases your comfort. Aim for good posture throughout the day.    Check your posture  The human body works best when it is properly aligned. To improve your standing posture, follow these steps:    Take a moment to close your eyes and feel your body. Then breathe deeply and relax your shoulders, hips, and knees.    Now, from the very top of your head, lift up just a bit. Think of a line linking your ears, shoulders, hips, and ankles. Adjust your body to follow the line. You may need to relax your hips and tuck your buttocks under a bit.    Next, take a look at yourself in a mirror. Is one ear, shoulder, or hip higher than the other? They should be level.  Check how you sit  When you sit properly, pressure on your back is reduced. Try these steps:    Sit so that the curve of your lower back fits easily against the chair. Keep your gaze level.    Support your feet. They should be flat on the floor or on a footrest. Your knees should be level with your hips.    Adjust the chair height as needed. Sit so your forearms are level with the work surface.  Proper posture helps  When your back is aligned, it s more likely to stay safe throughout the day.    Standing in place. Rest one foot on a stool or low box to ease pressure on your lower back. Switch feet often. If you can, adjust the height of your work surface so your neck and shoulders aren t under strain.    Driving. Sit close enough to the steering wheel to keep your knees slightly bent. For comfort, your knees should be level with your hips or just a bit lower. Sit as straight as you can. The curve of your lower back should be fully supported.    Walking. Stand tall and walk with your head up. Let your arms swing while you walk. This helps relax muscles. Wear shoes that fit and support your feet. If you will be  standing or walking for a long time, don t wear high heels.    Sitting and sleeping. Choose your furniture with care. Make sure it s not causing or increasing your back pain. Chairs should allow for comfortable, correct sitting posture. Use pillows for added support if needed. Your bed should support your back s natural curves without being too hard or too soft.  [employer] last reviewed this educational content on 5/1/2018 2000-2020 [employer]. All rights reserved. This information is not intended as a substitute for professional medical care. Always follow your healthcare professional's instructions.           Patient Education     Nonsurgical Treatment of Cervical Spine Problems  Cervical spine problems can often be treated without surgery. Choices may include rest, medicines, or injections. Your healthcare provider may also suggest physical therapy or certain exercises. These treatments may all help to relieve your symptoms and are often successful. If your symptoms don t subside, talk with your healthcare provider who may recommend surgery as your best treatment option.   Relieving your symptoms  Your healthcare provider may recommend:    Medicines. These help to reduce pain and inflammation.    Epidural steroid injections. These are injections into the spinal canal near the spinal cord. They may relieve severe pain and reduce inflammation.    Restricting aggravating activities. This can help to give your cervical spine time to heal.    A soft cervical collar. This can help to support and immobilize your neck while keeping your cervical spine aligned.    Traction. This may help relieve pressure on the irritated nerves.  Restoring mobility and strength  Your healthcare provider may recommend that you work with a physical therapist, an osteopathic doctor, or a chiropractor. This can help you regain mobility and strength in your neck. Physical therapy may last for 4 to 8 weeks. It may include:      Exercises to improve your neck s range of motion and strength.    Evaluation and correction of posture and body movements. This can correct problems that affect your cervical spine.    Heat, massage, and traction to help relieve your symptoms.  Self-care  You ll take an active role in your own therapy. To protect your neck from further injury:     Follow any exercise program given to you by your healthcare provider or physical therapist.    Practice good posture while sitting, standing, or moving.    Have your workspace evaluated. Rearrange it if needed.    When lying down, support your neck. You can use a special cervical pillow or rolled-up towel.  [employer] last reviewed this educational content on 5/1/2018 2000-2020 [employer]. All rights reserved. This information is not intended as a substitute for professional medical care. Always follow your healthcare professional's instructions.           Patient Education     Neck Exercises: Neck Flex  To start, sit in a chair with your feet flat on the floor. Your weight should be slightly forward so that you re balanced evenly on your buttocks. Relax your shoulders and keep your head level. Avoid arching your back or rounding your shoulders. Using a chair with arms may help you keep your balance:    Rest the back of your left hand against your lower back. Place your right palm on the top of your head.    Gently pull your head forward and down until you feel a stretch in the muscles in the back of your neck. Don t force the motion.    Hold for 20 seconds, then return to starting position. Switch arms.    Repeat 5 to 10 times.    [employer] last reviewed this educational content on 3/1/2018    9992-4179 [employer]. All rights reserved. This information is not intended as a substitute for professional medical care. Always follow your healthcare professional's instructions.           Patient Education     Neck Exercises: Neck and Torso  Rotation   To start, lie on your back, knees bent and feet flat on the floor. Keep your ears, shoulders, and hips aligned, but don t press your lower back to the floor. Breathe deeply and relax.    From starting position, drop both knees to one side. At the same time, turn your head and look in the other direction.    Keep both feet in contact with the floor and keep your arms at your sides.    Hold for 5 seconds. Then slowly switch sides.    Repeat 5 to 10 times.  [employer] last reviewed this educational content on 3/1/2018    8524-9665 [employer]. All rights reserved. This information is not intended as a substitute for professional medical care. Always follow your healthcare professional's instructions.           Patient Education     Common Spine and Disk Problems  The most common serious back problems happen when disks tear, bulge, or rupture. In such cases, an injured disk can no longer cushion the vertebrae and absorb shock. As a result, the rest of your spine may also weaken. This can lead to pain, stiffness, and other symptoms.                Torn annulus. A sudden movement may cause a tiny tear in an annulus. Nearby ligaments may stretch.    Contained herniated disk. As a disk wears out, the nucleus may bulge into the annulus and press on nerves.    Extruded herniated disk. When a disk ruptures, its nucleus can squeeze out and irritate a nerve.    Arthritis. As disks wear out over time, bone spurs form. These growths can irritate nerves and inflame facets.    Instability. As a disk stretches, the vertebrae slip back and forth. This can put pressure on the annulus.    Spondylolisthesis. This is a condition in which one vertebra has moved forward or backward, in relation to the one above or below it. This causes a crack (stress fracture) in the areas that link the vertebrae together. This may put pressure on the annulus, stretch the disk, and irritate nerves.  [employer] last reviewed this  educational content on 6/1/2018 2000-2020 [employer]. All rights reserved. This information is not intended as a substitute for professional medical care. Always follow your healthcare professional's instructions.

## 2021-04-10 ENCOUNTER — HEALTH MAINTENANCE LETTER (OUTPATIENT)
Age: 56
End: 2021-04-10

## 2021-05-27 ENCOUNTER — RECORDS - HEALTHEAST (OUTPATIENT)
Dept: ADMINISTRATIVE | Facility: CLINIC | Age: 56
End: 2021-05-27

## 2021-06-28 ENCOUNTER — OFFICE VISIT (OUTPATIENT)
Dept: ORTHOPEDICS | Facility: CLINIC | Age: 56
End: 2021-06-28
Payer: COMMERCIAL

## 2021-06-28 VITALS — BODY MASS INDEX: 24.28 KG/M2 | DIASTOLIC BLOOD PRESSURE: 70 MMHG | HEIGHT: 67 IN | SYSTOLIC BLOOD PRESSURE: 118 MMHG

## 2021-06-28 DIAGNOSIS — M62.838 MUSCLE SPASMS OF NECK: ICD-10-CM

## 2021-06-28 DIAGNOSIS — M62.830 SPASM OF THORACIC BACK MUSCLE: ICD-10-CM

## 2021-06-28 DIAGNOSIS — M54.12 LEFT CERVICAL RADICULOPATHY: Primary | ICD-10-CM

## 2021-06-28 DIAGNOSIS — M41.34 THORACOGENIC SCOLIOSIS OF THORACIC REGION: ICD-10-CM

## 2021-06-28 DIAGNOSIS — M54.2 CERVICALGIA: ICD-10-CM

## 2021-06-28 PROCEDURE — 99204 OFFICE O/P NEW MOD 45 MIN: CPT | Performed by: FAMILY MEDICINE

## 2021-06-28 RX ORDER — METHYLPREDNISOLONE 4 MG
TABLET, DOSE PACK ORAL
Qty: 21 TABLET | Refills: 0 | Status: SHIPPED | OUTPATIENT
Start: 2021-06-28 | End: 2023-01-05

## 2021-06-28 NOTE — LETTER
6/28/2021         RE: Lay Mccain  [address] [facility] [id number]        Dear Colleague,    Thank you for referring your patient, Lay Mccain, to the [facility]. Please see a copy of my visit note below.    ASSESSMENT & PLAN    Lay was seen today for pain.    Diagnoses and all orders for this visit:    Left cervical radiculopathy  -     methylPREDNISolone (MEDROL DOSEPAK) 4 MG tablet therapy pack; Follow Package Directions    Spasm of thoracic back muscle  -     Orthopedic & Spine  Referral    Muscle spasms of neck  -     Orthopedic & Spine  Referral    Thoracogenic scoliosis of thoracic region  -     Orthopedic & Spine  Referral    Cervicalgia  -     Orthopedic & Spine  Referral  -     methylPREDNISolone (MEDROL DOSEPAK) 4 MG tablet therapy pack; Follow Package Directions      This issue is chronic and Worsening.    # Left Cervical Radiculopathy: Notable over the past 6 years with pain over the medial border of the scapular.  There is tenderness to palpation medial border scapula on the left side as well as compression of the cervical nerves on the left side as well. She has tried physical therapy as well as chiropractic care in the past without improvement of her symptoms. Etiology likely cervical radiculopathy. No weakness noted on left upper extremity. Given this plan to treat as below and follow-up if not proving over the next 2 weeks. Will consider cervical MRI and possible epidural steroid injection at that time as well as referral to physical therapy again.  Image Findings: degenerative changes in the cervical and thoracic regions  Treatment: activities as tolerated, home exercises, physical therapy  Job: no restrictions   Medications/Injections: Medrol dosepak  Follow-up: 2 weeks after Medrol dose pack if not improving, message me via [employer]    Remi Mcgill MD  [facility]  "[place]    -----  Chief Complaint   Patient presents with     Middle Back - Pain       SUBJECTIVE  Lay Mccain is a/an 55 year old female who is seen in consultation at the request of  Carla Carrasco C.N.P. for evaluation of chronic cervical pain.     The patient is seen by themselves.  The patient is Right handed    Onset: 2015, 6 years(s) ago. Reports insidious onset without acute precipitating event.  Pain over medial scapula.  Pain initially didn't affect sleep but over the past couple of months has noticed this.  Pt wants to have something to help with her symptoms  Location of Pain: left sided thoracic pain   Worsened by: constant pain, no specific movements   Better with: heat  Treatments tried: physical therapy 2017 most recent, chiropractic   Associated symptoms: numbness and tingling in left arm radiating to hand     Orthopedic/Surgical history: YES - Chronic back pain  Social History/Occupation: Office work     No family history pertinent to patient's problem today. No family history pertinent to the patient's problem today      REVIEW OF SYSTEMS:  Review of Systems  Constitutional, HEENT, cardiovascular, pulmonary, GI, , musculoskeletal, neuro, skin, endocrine and psych systems are negative, except as otherwise noted.    OBJECTIVE:  /70   Ht 1.702 m (5' 7\")   LMP 11/29/2013   BMI 24.28 kg/m     General: healthy, alert and in no distress  HEENT: no scleral icterus or conjunctival erythema  Skin: no suspicious lesions or rash. No jaundice.  CV: distal perfusion intact   Resp: normal respiratory effort without conversational dyspnea   Psych: normal mood and affect  Gait: normal steady gait with appropriate coordination and balance   Neuro: Normal light sensory exam of bilateral upper extremities    Cervical  Inspection: increased cervical kyphosis  Tender:  medial border of scapula on left side  Non-tender:  left paracervical muscles, right paracervical muscles  Range of Motion:  Full " ROM of cervical spine  Strength: Full strength of all neck muscles  Special tests:  Spurling's - positive - left, Spurling's - negative - right, Mcghee's - negative - left, Mcghee's - negative - right    RADIOLOGY:  I independently  visualized and reviewed these images with the patient    CERVICAL SPINE THREE VIEWS  3/24/2021 4:19 PM      HISTORY: Muscle spasms of neck.     COMPARISON: 8/28/2014                                                                      IMPRESSION: Moderate multilevel degenerative disc disease is present  in the mid to lower cervical spine with progression since prior study.  Posterior alignment is normal. Vertebral body heights are maintained.  There is also some mild lower cervical degenerative facet disease.     CLAYTON MIKE MD    THORACIC SPINE TWO VIEWS  3/24/2021 4:19 PM      HISTORY: Spasm of thoracic back muscle.     COMPARISON: None.                                                                      IMPRESSION: Mild to moderate mid thoracic degenerative disc disease is  present. Vertebral body heights are maintained. Posterior alignment is  normal. Bridging syndesmophytes are noted at several levels.     CLAYTON MIKE MD           Again, thank you for allowing me to participate in the care of your patient.        Sincerely,        Remi Mcgill MD

## 2021-06-28 NOTE — PATIENT INSTRUCTIONS
# Left Cervical Radiculopathy: Notable over the past 6 years with pain over the medial border of the scapular.  there is tenderness to palpation medial border scapula on the left side as well as compression of the cervical nerves on the left side as well. She has tried physical therapy as well as chiropractic care in the past without improvement of her symptoms. Etiology likely cervical radiculopathy. No weakness noted on left upper extremity. Given this plan to treat as below and follow-up if not proving over the next 2 weeks. Will consider cervical MRI and possible epidural steroid injection at that time as well as referral to physical therapy again.  Image Findings: degenerative changes in the cervical and thoracic regions  Treatment: activities as tolerated, home exercises, physical therapy  Job: no restrictions   Medications/Injections: Medrol dosepak  Follow-up: 2 weeks after Medrol dose pack if not improving, message me via [employer]    Please call [phone]   Ask for my team if you have any questions or concerns    It was great seeing you again today!    Remi Mcgill MD, [facility]    Patient Education     Understanding Cervical Radiculopathy    Cervical radiculopathy is irritation or inflammation of a nerve root in the neck. It causes neck pain and other symptoms that may spread into the chest or down the arm. To understand this condition, it helps to understand the parts of the spine:    Vertebrae. These are bones that stack to form the spine. The cervical spine contains the 7 vertebrae in the neck.    Disks. These are soft pads of tissue between the vertebrae. They act as shock absorbers for the spine.    The spinal canal. This is a tunnel formed within the stacked vertebrae. The spinal cord runs through this canal.    Nerves. These branch off the spinal cord. As they leave the spinal canal, nerves pass through openings between the vertebrae. The nerve root is the part of the nerve that is closest to  the spinal cord.   With cervical radiculopathy, nerve roots in the neck become irritated. This leads to pain and symptoms that can travel to the nerves that go from the spinal cord down the arms and into the torso.  What causes cervical radiculopathy?  Aging, injury, poor posture, and other issues can lead to problems in the neck. These problems may then irritate nerve roots. These include:    Damage to a disk in the cervical spine. The damaged disk may then press on nearby nerve roots.    Degeneration from wear and tear, and aging. This can lead to narrowing (stenosis) of the openings between the vertebrae. The narrowed openings press on nerve roots as they leave the spinal canal.    An unstable spine. This is when a vertebra slips forward. It can then press on a nerve root.  There are other, less common causes of pressure on nerves in the neck. These include infection, cysts, and tumors.  Symptoms of cervical radiculopathy  These include:    Neck pain    Pain, numbness, tingling, or weakness that travels down the arm    Loss of neck movement    Muscle spasms  Treatment for cervical radiculopathy  In most cases, your healthcare provider will first try treatments that help relieve symptoms. These may include:    Prescription or over-the-counter pain medicines. These help relieve pain and swelling.    Cold packs. These help reduce pain.    Resting. This involves avoiding positions and activities that increase pain.    Neck brace (cervical collar). This can help relieve inflammation and pain.    Physical therapy, including exercises and stretches. This can help decrease pain and increase movement and function.    Shots of medicinesaround the nerve roots. This is done to help relieve symptoms for a time.  In some cases, your healthcare provider may advise surgery to fix the underlying problem. This depends on the cause, the symptoms, and how long the pain has lasted.  Possible complications  Over time, an irritated and  inflamed nerve may become damaged. This may lead to long-lasting (permanent) numbness or weakness. If symptoms change suddenly or get worse, be sure to let your healthcare provider know.     When to call your healthcare provider  Call your healthcare provider right away if you have any of these:    New pain or pain that gets worse    New or increasing weakness, numbness, or tingling in your arm or hand    Bowel or bladder changes   [employer] last reviewed this educational content on 3/10/2016    3814-4838 [employer]. All rights reserved. This information is not intended as a substitute for professional medical care. Always follow your healthcare professional's instructions.

## 2021-06-28 NOTE — PROGRESS NOTES
ASSESSMENT & PLAN    Lay was seen today for pain.    Diagnoses and all orders for this visit:    Left cervical radiculopathy  -     methylPREDNISolone (MEDROL DOSEPAK) 4 MG tablet therapy pack; Follow Package Directions    Spasm of thoracic back muscle  -     Orthopedic & Spine  Referral    Muscle spasms of neck  -     Orthopedic & Spine  Referral    Thoracogenic scoliosis of thoracic region  -     Orthopedic & Spine  Referral    Cervicalgia  -     Orthopedic & Spine  Referral  -     methylPREDNISolone (MEDROL DOSEPAK) 4 MG tablet therapy pack; Follow Package Directions      This issue is chronic and Worsening.    # Left Cervical Radiculopathy: Notable over the past 6 years with pain over the medial border of the scapular.  There is tenderness to palpation medial border scapula on the left side as well as compression of the cervical nerves on the left side as well. She has tried physical therapy as well as chiropractic care in the past without improvement of her symptoms. Etiology likely cervical radiculopathy. No weakness noted on left upper extremity. Given this plan to treat as below and follow-up if not proving over the next 2 weeks. Will consider cervical MRI and possible epidural steroid injection at that time as well as referral to physical therapy again.  Image Findings: degenerative changes in the cervical and thoracic regions  Treatment: activities as tolerated, home exercises, physical therapy  Job: no restrictions   Medications/Injections: Medrol dosepak  Follow-up: 2 weeks after Medrol dose pack if not improving, message me via [employer] Mcgill MD  [facility] SPORTS MEDICINE [facility]    -----  Chief Complaint   Patient presents with     Middle Back - Pain       SUBJECTIVE  Lay Mccain is a/an 55 year old female who is seen in consultation at the request of  Carla Carrasco C.N.P. for evaluation of chronic cervical pain.     The  "patient is seen by themselves.  The patient is Right handed    Onset: 2015, 6 years(s) ago. Reports insidious onset without acute precipitating event.  Pain over medial scapula.  Pain initially didn't affect sleep but over the past couple of months has noticed this.  Pt wants to have something to help with her symptoms  Location of Pain: left sided thoracic pain   Worsened by: constant pain, no specific movements   Better with: heat  Treatments tried: physical therapy 2017 most recent, chiropractic   Associated symptoms: numbness and tingling in left arm radiating to hand     Orthopedic/Surgical history: YES - Chronic back pain  Social History/Occupation: Office work     No family history pertinent to patient's problem today. No family history pertinent to the patient's problem today      REVIEW OF SYSTEMS:  Review of Systems  Constitutional, HEENT, cardiovascular, pulmonary, GI, , musculoskeletal, neuro, skin, endocrine and psych systems are negative, except as otherwise noted.    OBJECTIVE:  /70   Ht 1.702 m (5' 7\")   LMP 11/29/2013   BMI 24.28 kg/m     General: healthy, alert and in no distress  HEENT: no scleral icterus or conjunctival erythema  Skin: no suspicious lesions or rash. No jaundice.  CV: distal perfusion intact   Resp: normal respiratory effort without conversational dyspnea   Psych: normal mood and affect  Gait: normal steady gait with appropriate coordination and balance   Neuro: Normal light sensory exam of bilateral upper extremities    Cervical  Inspection: increased cervical kyphosis  Tender:  medial border of scapula on left side  Non-tender:  left paracervical muscles, right paracervical muscles  Range of Motion:  Full ROM of cervical spine  Strength: Full strength of all neck muscles  Special tests:  Spurling's - positive - left, Spurling's - negative - right, Mcghee's - negative - left, Mcghee's - negative - right    RADIOLOGY:  I independently  visualized and reviewed these " images with the patient    CERVICAL SPINE THREE VIEWS  3/24/2021 4:19 PM      HISTORY: Muscle spasms of neck.     COMPARISON: 8/28/2014                                                                      IMPRESSION: Moderate multilevel degenerative disc disease is present  in the mid to lower cervical spine with progression since prior study.  Posterior alignment is normal. Vertebral body heights are maintained.  There is also some mild lower cervical degenerative facet disease.     CLAYTON MIKE MD    THORACIC SPINE TWO VIEWS  3/24/2021 4:19 PM      HISTORY: Spasm of thoracic back muscle.     COMPARISON: None.                                                                      IMPRESSION: Mild to moderate mid thoracic degenerative disc disease is  present. Vertebral body heights are maintained. Posterior alignment is  normal. Bridging syndesmophytes are noted at several levels.     CLAYTON MIKE MD

## 2021-09-19 ENCOUNTER — HEALTH MAINTENANCE LETTER (OUTPATIENT)
Age: 56
End: 2021-09-19

## 2022-01-07 ENCOUNTER — TELEPHONE (OUTPATIENT)
Dept: OBGYN | Facility: CLINIC | Age: 57
End: 2022-01-07
Payer: COMMERCIAL

## 2022-01-07 NOTE — TELEPHONE ENCOUNTER
Panel Management Review      Health Maintenance List    Health Maintenance   Topic Date Due     ADVANCE CARE PLANNING  Never done     COVID-19 Vaccine (1) Never done     COLORECTAL CANCER SCREENING  Never done     HIV SCREENING  Never done     HEPATITIS C SCREENING  Never done     DTAP/TDAP/TD IMMUNIZATION (1 - Tdap) Never done     ZOSTER IMMUNIZATION (1 of 2) Never done     HPV TEST  08/07/2021     PAP  08/07/2021     INFLUENZA VACCINE (1) Never done     PHQ-2  01/01/2022     PREVENTIVE CARE VISIT  12/11/2021     MAMMO SCREENING  12/17/2021     LIPID  10/22/2024     Pneumococcal Vaccine: Pediatrics (0 to 5 Years) and At-Risk Patients (6 to 64 Years)  Aged Out     IPV IMMUNIZATION  Aged Out     MENINGITIS IMMUNIZATION  Aged Out     HEPATITIS B IMMUNIZATION  Aged Out       Composite cancer screening  Chart review shows that this patient is due/due soon for the following Colonoscopy  Lab Results   Component Value Date    PAP NIL 08/07/2018     Past Surgical History:   Procedure Laterality Date     NO HISTORY OF SURGERY  11/2011       Is hysterectomy listed in surgical history? No   Is mastectomy listed in surgical history? No     Summary:    Patient is due/failing the following:   Colonoscopy    Action needed: Patient needs office visit for colonoscopy.    Type of outreach:  Sent Yappe message.      Staff Signature:  [email or web address]

## 2022-01-07 NOTE — LETTER
2022      Lay Mccain  [address] [place] [id number]        Dear Lay,   To ensure we are providing the best quality care, we have reviewed your chart and see that you are due for:    Colon Cancer Screening:    If you have received a referral to schedule a Colonoscopy or choose to do a Colonoscopy instead of the FIT test, please call [phone] to schedule.    If you have received a FIT test kit from a prior office visit, please check the expiration date. If , please get a new kit from the Lab/ Clinic. If not , please complete the test and mail in as soon as you are able.    You may call Dept: [phone] if you have any questions. If you have completed the tests outside of [facility], please have the results forwarded to our office Fax # [phone]. We will update the chart for your primary Physician to review before your next annual physical.          Sincerely,        Carmen Scott MD

## 2022-01-20 ENCOUNTER — HOSPITAL ENCOUNTER (OUTPATIENT)
Dept: MAMMOGRAPHY | Facility: CLINIC | Age: 57
Discharge: HOME | End: 2022-01-20
Attending: NURSE PRACTITIONER | Admitting: NURSE PRACTITIONER
Payer: COMMERCIAL

## 2022-01-20 DIAGNOSIS — Z12.31 VISIT FOR SCREENING MAMMOGRAM: ICD-10-CM

## 2022-01-20 PROCEDURE — 77067 SCR MAMMO BI INCL CAD: CPT

## 2022-03-06 ENCOUNTER — HEALTH MAINTENANCE LETTER (OUTPATIENT)
Age: 57
End: 2022-03-06

## 2022-11-21 ENCOUNTER — HEALTH MAINTENANCE LETTER (OUTPATIENT)
Age: 57
End: 2022-11-21

## 2023-01-04 ASSESSMENT — ENCOUNTER SYMPTOMS
PALPITATIONS: 0
HEARTBURN: 0
EYE PAIN: 0
HEMATURIA: 0
MYALGIAS: 0
WEAKNESS: 0
DIZZINESS: 0
COUGH: 0
ARTHRALGIAS: 0
HEMATOCHEZIA: 0
JOINT SWELLING: 0
SORE THROAT: 0
HEADACHES: 0
FREQUENCY: 0
ABDOMINAL PAIN: 0
DYSURIA: 0
BREAST MASS: 0
SHORTNESS OF BREATH: 0
DIARRHEA: 0
PARESTHESIAS: 0
NAUSEA: 0
NERVOUS/ANXIOUS: 0
CONSTIPATION: 0
CHILLS: 0
FEVER: 0

## 2023-01-05 ENCOUNTER — OFFICE VISIT (OUTPATIENT)
Dept: FAMILY MEDICINE | Facility: CLINIC | Age: 58
End: 2023-01-05
Payer: COMMERCIAL

## 2023-01-05 VITALS
RESPIRATION RATE: 17 BRPM | TEMPERATURE: 97.3 F | BODY MASS INDEX: 24.33 KG/M2 | WEIGHT: 155 LBS | HEIGHT: 67 IN | DIASTOLIC BLOOD PRESSURE: 73 MMHG | OXYGEN SATURATION: 98 % | HEART RATE: 73 BPM | SYSTOLIC BLOOD PRESSURE: 115 MMHG

## 2023-01-05 DIAGNOSIS — M89.8X1 PAIN OF LEFT SCAPULA: ICD-10-CM

## 2023-01-05 DIAGNOSIS — Z00.00 ROUTINE GENERAL MEDICAL EXAMINATION AT A HEALTH CARE FACILITY: Primary | ICD-10-CM

## 2023-01-05 DIAGNOSIS — M54.2 CERVICALGIA: ICD-10-CM

## 2023-01-05 DIAGNOSIS — Z12.11 SCREEN FOR COLON CANCER: ICD-10-CM

## 2023-01-05 DIAGNOSIS — Z12.4 CERVICAL CANCER SCREENING: ICD-10-CM

## 2023-01-05 PROCEDURE — 99396 PREV VISIT EST AGE 40-64: CPT | Performed by: NURSE PRACTITIONER

## 2023-01-05 PROCEDURE — G0145 SCR C/V CYTO,THINLAYER,RESCR: HCPCS | Performed by: NURSE PRACTITIONER

## 2023-01-05 PROCEDURE — 87624 HPV HI-RISK TYP POOLED RSLT: CPT | Performed by: NURSE PRACTITIONER

## 2023-01-05 RX ORDER — TIZANIDINE 2 MG/1
2 TABLET ORAL 3 TIMES DAILY
Qty: 30 TABLET | Refills: 0 | Status: SHIPPED | OUTPATIENT
Start: 2023-01-05 | End: 2023-09-19

## 2023-01-05 RX ORDER — TIZANIDINE 2 MG/1
2 TABLET ORAL 3 TIMES DAILY
Qty: 30 TABLET | Refills: 0 | Status: SHIPPED | OUTPATIENT
Start: 2023-01-05 | End: 2023-01-05

## 2023-01-05 ASSESSMENT — ENCOUNTER SYMPTOMS
PARESTHESIAS: 0
HEMATOCHEZIA: 0
FREQUENCY: 0
DIZZINESS: 0
DIARRHEA: 0
NAUSEA: 0
COUGH: 0
ABDOMINAL PAIN: 0
MYALGIAS: 0
WEAKNESS: 0
BREAST MASS: 0
CHILLS: 0
EYE PAIN: 0
ARTHRALGIAS: 0
SORE THROAT: 0
FEVER: 0
NERVOUS/ANXIOUS: 0
SHORTNESS OF BREATH: 0
CONSTIPATION: 0
HEADACHES: 0
HEMATURIA: 0
JOINT SWELLING: 0
PALPITATIONS: 0
HEARTBURN: 0
DYSURIA: 0

## 2023-01-05 ASSESSMENT — PAIN SCALES - GENERAL: PAINLEVEL: NO PAIN (0)

## 2023-01-05 NOTE — PATIENT INSTRUCTIONS
1200 mg calcium  1000 international unit(s) vitamin D    Preventive Health Recommendations  Female Ages 50 - 64    Yearly exam: See your health care provider every year in order to  Review health changes.   Discuss preventive care.    Review your medicines if your doctor has prescribed any.    Get a Pap test every three years (unless you have an abnormal result and your provider advises testing more often).  If you get Pap tests with HPV test, you only need to test every 5 years, unless you have an abnormal result.   You do not need a Pap test if your uterus was removed (hysterectomy) and you have not had cancer.  You should be tested each year for STDs (sexually transmitted diseases) if you're at risk.   Have a mammogram every 1 to 2 years.  Have a colonoscopy at age 50, or have a yearly FIT test (stool test). These exams screen for colon cancer.    Have a cholesterol test every 5 years, or more often if advised.  Have a diabetes test (fasting glucose) every three years. If you are at risk for diabetes, you should have this test more often.   If you are at risk for osteoporosis (brittle bone disease), think about having a bone density scan (DEXA).    Shots: Get a flu shot each year. Get a tetanus shot every 10 years.    Nutrition:   Eat at least 5 servings of fruits and vegetables each day.  Eat whole-grain bread, whole-wheat pasta and brown rice instead of white grains and rice.  Get adequate Calcium and Vitamin D.     Lifestyle  Exercise at least 150 minutes a week (30 minutes a day, 5 days a week). This will help you control your weight and prevent disease.  Limit alcohol to one drink per day.  No smoking.   Wear sunscreen to prevent skin cancer.   See your dentist every six months for an exam and cleaning.  See your eye doctor every 1 to 2 years.

## 2023-01-05 NOTE — PROGRESS NOTES
SUBJECTIVE:   CC: Lay is an 57 year old who presents for preventive health visit.     Healthy Habits:     Getting at least 3 servings of Calcium per day:  Yes    Bi-annual eye exam:  Yes    Dental care twice a year:  Yes    Sleep apnea or symptoms of sleep apnea:  None    Diet:  Regular (no restrictions)    Frequency of exercise:  6-7 days/week    Duration of exercise:  45-60 minutes    Taking medications regularly:  Yes    Medication side effects:  Not applicable    PHQ-2 Total Score: 0    Additional concerns today:  No    Some pain left scapula area for several years  Started after alpine sliding.  Constant ache  Has seen Ortho in the past.   Previous Imaging of neck and upper back     Referred from the scapula region into left axilla  Oral prednisone tylenol and ibuprofen not effective  Flexeril SE not tolerable.   Heating pain helps at times  Stress makes the pain worse    No new partner change   Post menopausal with vaginal dryness      -------------------------------------    Today's PHQ-2 Score:   PHQ-2 ( 1999 Pfizer) 1/4/2023   Q1: Little interest or pleasure in doing things 0   Q2: Feeling down, depressed or hopeless 0   PHQ-2 Score 0   PHQ-2 Total Score (12-17 Years)- Positive if 3 or more points; Administer PHQ-A if positive -   Q1: Little interest or pleasure in doing things Not at all   Q2: Feeling down, depressed or hopeless Not at all   PHQ-2 Score 0       Have you ever done Advance Care Planning? (For example, a Health Directive, POLST, or a discussion with a medical provider or your loved ones about your wishes):     Social History     Tobacco Use     Smoking status: Never     Smokeless tobacco: Never   Substance Use Topics     Alcohol use: Yes     Comment: Avg. 5 drinks per week         Alcohol Use 1/4/2023   Prescreen: >3 drinks/day or >7 drinks/week? No   Prescreen: >3 drinks/day or >7 drinks/week? -       Reviewed orders with patient.  Reviewed health maintenance and updated orders  accordingly - Yes  BP Readings from Last 3 Encounters:   01/05/23 115/73   06/28/21 118/70   03/24/21 110/70    Wt Readings from Last 3 Encounters:   01/05/23 70.3 kg (155 lb)   03/24/21 70.3 kg (155 lb)   12/16/20 72.6 kg (160 lb)                  Patient Active Problem List   Diagnosis     CARDIOVASCULAR SCREENING; LDL GOAL LESS THAN 160     Vulvar discomfort     Premature atrial contractions     Annual physical exam     Left ankle swelling     Abnormal antinuclear antibody titer     Generalized anxiety disorder     Abnormal Pap smear of cervix     Past Surgical History:   Procedure Laterality Date     NO HISTORY OF SURGERY  11/2011       Social History     Tobacco Use     Smoking status: Never     Smokeless tobacco: Never   Substance Use Topics     Alcohol use: Yes     Comment: Avg. 5 drinks per week     Family History   Problem Relation Age of Onset     Heart Disease Mother         bicuspid aortic valve     Hypertension Mother      Cerebrovascular Disease Maternal Grandmother      Heart Disease Maternal Grandmother      Cerebrovascular Disease Maternal Grandfather      Heart Disease Maternal Grandfather      Heart Disease Paternal Grandfather      Cancer Brother         Passed away 12/2014         Current Outpatient Medications   Medication Sig Dispense Refill     magnesium 250 MG tablet Take 1 tablet by mouth daily       tiZANidine (ZANAFLEX) 2 MG tablet Take 1 tablet (2 mg) by mouth 3 times daily 30 tablet 0     TURMERIC PO        Allergies   Allergen Reactions     Clindamycin Hives and Rash     Vicodin [Hydrocodone-Acetaminophen]      Passed out       Breast Cancer Screening:    Breast CA Risk Assessment (FHS-7) 1/4/2023   Do you have a family history of breast, colon, or ovarian cancer? No / Unknown         Mammogram Screening: Recommended annual mammography  Pertinent mammograms are reviewed under the imaging tab.    History of abnormal Pap smear:   NO - age 30-65 PAP every 5 years with negative HPV  co-testing recommended  Last 3 Pap and HPV Results:   PAP / HPV Latest Ref Rng & Units 2018 2/15/2016 2014   PAP (Historical) - NIL NIL NIL   HPV16 NEG:Negative Negative Negative -   HPV18 NEG:Negative Negative Negative -   HRHPV NEG:Negative Negative Negative -     PAP / HPV Latest Ref Rng & Units 2018 2/15/2016 2014   PAP (Historical) - NIL NIL NIL   HPV16 NEG:Negative Negative Negative -   HPV18 NEG:Negative Negative Negative -   HRHPV NEG:Negative Negative Negative -     Reviewed and updated as needed this visit by clinical staff   Tobacco  Allergies  Meds              Reviewed and updated as needed this visit by Provider                 Past Medical History:   Diagnosis Date     ASCUS favor benign     -HPV per [facility] rpt cotesting 3 yrs:      Vulvodynia       Past Surgical History:   Procedure Laterality Date     NO HISTORY OF SURGERY  2011     OB History    Para Term  AB Living   2 1 1 0 1 1   SAB IAB Ectopic Multiple Live Births   1 0 0 0 1      # Outcome Date GA Lbr Bryant/2nd Weight Sex Delivery Anes PTL Lv   2 SAB 2007     SAB   DEC   1 Term 04 40w0d  3.856 kg (8 lb 8 oz) F    GWENDOLYN       Review of Systems   Constitutional: Negative for chills and fever.   HENT: Negative for congestion, ear pain, hearing loss and sore throat.    Eyes: Negative for pain and visual disturbance.   Respiratory: Negative for cough and shortness of breath.    Cardiovascular: Negative for chest pain, palpitations and peripheral edema.   Gastrointestinal: Negative for abdominal pain, constipation, diarrhea, heartburn, hematochezia and nausea.   Breasts:  Negative for tenderness, breast mass and discharge.   Genitourinary: Negative for dysuria, frequency, genital sores, hematuria, pelvic pain, urgency, vaginal bleeding and vaginal discharge.   Musculoskeletal: Negative for arthralgias, joint swelling and myalgias.   Skin: Negative for rash.   Neurological: Negative for dizziness,  "weakness, headaches and paresthesias.   Psychiatric/Behavioral: Negative for mood changes. The patient is not nervous/anxious.      Left scapula pain that wraps around to the left breast        OBJECTIVE:   /73   Pulse 73   Temp 97.3  F (36.3  C) (Tympanic)   Resp 17   Ht 1.702 m (5' 7\")   Wt 70.3 kg (155 lb)   LMP 11/29/2013   SpO2 98%   BMI 24.28 kg/m    Physical Exam  GENERAL APPEARANCE: healthy, alert and no distress  EYES: Eyes grossly normal to inspection, PERRL and conjunctivae and sclerae normal  HENT: ear canals and TM's normal, nose and mouth without ulcers or lesions, oropharynx clear and oral mucous membranes moist  NECK: no adenopathy, no asymmetry, masses, or scars and thyroid normal to palpation  RESP: lungs clear to auscultation - no rales, rhonchi or wheezes  BREAST: normal without masses, tenderness or nipple discharge and no palpable axillary masses or adenopathy  CV: regular rate and rhythm, normal S1 S2, no S3 or S4, no murmur, click or rub, no peripheral edema and peripheral pulses strong  ABDOMEN: soft, nontender, no hepatosplenomegaly, no masses and bowel sounds normal  MS: no musculoskeletal defects are noted and gait is age appropriate without ataxia pain and muscle spasming left scapula and to the left upper thoracic spine   Pelvic exam: normal vagina and vulva, normal cervix without lesions or tenderness, uterus normal size anteverted, adenxa normal in size without tenderness, pap smear done.    SKIN: no suspicious lesions or rashes  NEURO: Normal strength and tone, sensory exam grossly normal, mentation intact and speech normal  PSYCH: mentation appears normal and affect normal/bright    Diagnostic Test Results:  Labs reviewed in Epic      ASSESSMENT/PLAN:       ASSESSMENT / PLAN:  (Z00.00) Routine general medical examination at a health care facility  (primary encounter diagnosis)    (M89.8X1) Pain of left scapula  Comment: MRI with ongoing symptoms  Plan: symptomatic care " strategies reviewed      (M54.2) Cervicalgia  Plan: tiZANidine (ZANAFLEX) 2 MG tablet  Heat, Rest, tylenol up to 3500 mg daily  Topical voltaren        (Z12.4) Cervical cancer screening  Comment: done today   Plan: PAP screen with HPV - recommended age 30 - 65         years      (Z12.11) Screen for colon cancer  Comment: due colonoscopy declined  Plan: Fecal colorectal cancer screen (FIT)          Vaccine declined today           COUNSELING:  Reviewed preventive health counseling, as reflected in patient instructions        She reports that she has never smoked. She has never used smokeless tobacco.      Call or return to the clinic with any worsening of symptoms or no resolution. Patient/Parent verbalized understanding and is in agreement. Medication side effects reviewed.   Current Outpatient Medications   Medication Sig Dispense Refill     magnesium 250 MG tablet Take 1 tablet by mouth daily       tiZANidine (ZANAFLEX) 2 MG tablet Take 1 tablet (2 mg) by mouth 3 times daily 30 tablet 0     TURMERIC PO        Chart documentation with Dragon Voice recognition Software. Although reviewed after completion, some words and grammatical errors may remain.    KIRA Mendez [facility]

## 2023-01-09 LAB
BKR LAB AP GYN ADEQUACY: NORMAL
BKR LAB AP GYN INTERPRETATION: NORMAL
BKR LAB AP HPV REFLEX: NORMAL
BKR LAB AP LMP: NORMAL
BKR LAB AP PREVIOUS ABNORMAL: NORMAL
PATH REPORT.COMMENTS IMP SPEC: NORMAL
PATH REPORT.COMMENTS IMP SPEC: NORMAL
PATH REPORT.RELEVANT HX SPEC: NORMAL

## 2023-01-10 LAB
HUMAN PAPILLOMA VIRUS 16 DNA: NEGATIVE
HUMAN PAPILLOMA VIRUS 18 DNA: NEGATIVE
HUMAN PAPILLOMA VIRUS FINAL DIAGNOSIS: NORMAL
HUMAN PAPILLOMA VIRUS OTHER HR: NEGATIVE

## 2023-02-13 ENCOUNTER — HOSPITAL ENCOUNTER (EMERGENCY)
Facility: CLINIC | Age: 58
Discharge: HOME | End: 2023-02-13
Attending: EMERGENCY MEDICINE | Admitting: EMERGENCY MEDICINE
Payer: COMMERCIAL

## 2023-02-13 VITALS
RESPIRATION RATE: 16 BRPM | HEART RATE: 81 BPM | SYSTOLIC BLOOD PRESSURE: 133 MMHG | OXYGEN SATURATION: 100 % | DIASTOLIC BLOOD PRESSURE: 87 MMHG | TEMPERATURE: 98.5 F

## 2023-02-13 DIAGNOSIS — L98.9 BENIGN SKIN LESION OF THIGH: ICD-10-CM

## 2023-02-13 PROCEDURE — 99282 EMERGENCY DEPT VISIT SF MDM: CPT | Performed by: EMERGENCY MEDICINE

## 2023-02-13 PROCEDURE — 99283 EMERGENCY DEPT VISIT LOW MDM: CPT | Performed by: EMERGENCY MEDICINE

## 2023-02-13 NOTE — ED TRIAGE NOTES
Pt presents for eval of a purple spot on her right thigh that she noticed yesterday. Pt is concerned with blood clot but denies pain.     Triage Assessment     Row Name 02/13/23 0541       Triage Assessment (Adult)    Airway WDL WDL       Respiratory WDL    Respiratory WDL WDL       Skin Circulation/Temperature WDL    Skin Circulation/Temperature WDL WDL       Cardiac WDL    Cardiac WDL WDL       Peripheral/Neurovascular WDL    Peripheral Neurovascular WDL WDL       Cognitive/Neuro/Behavioral WDL    Cognitive/Neuro/Behavioral WDL WDL

## 2023-02-13 NOTE — ED PROVIDER NOTES
History     Chief Complaint   Patient presents with     Leg Swelling     HPI  Lay Mccain is a 57 year old female who presents secondary to red lesion over the right thigh and right shin.  Noticed these last night.  Denies inciting trauma.  No chronic or OTC meds.  Does not smoke, minimal alcohol, no illicit substance use.  Denies sore throat fever cough congestion.    Allergies:  Allergies   Allergen Reactions     Clindamycin Hives and Rash     Vicodin [Hydrocodone-Acetaminophen]      Passed out       Problem List:    Patient Active Problem List    Diagnosis Date Noted     Abnormal Pap smear of cervix 12/09/2013     Priority: Medium     12/6/2012 Pap - ASCUS, Neg HPV.   12/9/13: Pap - NIL, Neg HPV with endometrial cells present. EMB - neg. Repeat pap in 1 year.   1/2014 negative endometrial biopsy       Generalized anxiety disorder 10/08/2013     Priority: Medium     Diagnosis updated by automated process. Provider to review and confirm.       Abnormal antinuclear antibody titer 01/04/2013     Priority: Medium     Annual physical exam 12/06/2012     Priority: Medium     Left ankle swelling 12/06/2012     Priority: Medium     Xray  ra labs  Rheumatology consult       Premature atrial contractions 07/30/2012     Priority: Medium     Vulvar discomfort 11/17/2011     Priority: Medium     Trial of clobetasol  If no resolution point injection  Possible repeat biopsy/colposcopy of area-left labia-  12/15/11  The pathology from the vulvar biopsy came back showing possible signs of the HPV virus. I would like her to return to see one of the GYN physicians for further evaluation and possible re-biopsy. This is nothing horribly urgent, in the next few months would be fine-  Cherelle Londono, MSN,CNP  2/10/12 neg hpv  12/6/12 neg hpv         CARDIOVASCULAR SCREENING; LDL GOAL LESS THAN 160 10/31/2010     Priority: Medium        Past Medical History:    Past Medical History:   Diagnosis Date     ASCUS favor benign 2012      Vulvodynia        Past Surgical History:    Past Surgical History:   Procedure Laterality Date     NO HISTORY OF SURGERY  11/2011       Family History:    Family History   Problem Relation Age of Onset     Heart Disease Mother         bicuspid aortic valve     Hypertension Mother      Cerebrovascular Disease Maternal Grandmother      Heart Disease Maternal Grandmother      Cerebrovascular Disease Maternal Grandfather      Heart Disease Maternal Grandfather      Heart Disease Paternal Grandfather      Cancer Brother         Passed away 12/2014       Social History:  Marital Status:   [2]  Social History     Tobacco Use     Smoking status: Never     Smokeless tobacco: Never   Substance Use Topics     Alcohol use: Yes     Comment: Avg. 5 drinks per week     Drug use: No        Medications:    magnesium 250 MG tablet  tiZANidine (ZANAFLEX) 2 MG tablet  TURMERIC PO          Review of Systems  Problem focused review of systems otherwise negative    Physical Exam   BP: 133/87  Pulse: 81  Temp: 98.5  F (36.9  C)  Resp: 16  SpO2: 100 %      Physical Exam  Nontoxic in no respiratory distress alert and oriented  Oropharynx moist without lesions, mucous membranes moist  No cervical adenopathy  Skin Pink warm and dry no lesions over the chest or back, right lower extremity shows erythematous macule over the thigh and a smaller one over the shin.  Calf, popliteal fossa medial thigh nontender there is no associated limb swelling, no associated tenderness or induration, lesions do not shravan, pedal pulses intact.  See photos below    Right shin        Right thigh        ED Course                       No results found for this or any previous visit (from the past 24 hour(s)).    Medications - No data to display    Assessments & Plan (with Medical Decision Making)  Right thigh and shin lesions as above, differential includes contusion, urticaria, vasculitis, infectious, versus other.  Most consistent with contusion, watchful  waiting appropriate, return for fever, swelling, pain or any other concern.     I have reviewed the nursing notes.    I have reviewed the findings, diagnosis, plan and need for follow up with the patient.          New Prescriptions    No medications on file       Final diagnoses:   Benign skin lesion of thigh       2/13/2023   [facility] EMERGENCY DEPT     Refugio Arriola MD  02/13/23 0658

## 2023-02-27 ENCOUNTER — TELEPHONE (OUTPATIENT)
Dept: ORTHOPEDICS | Facility: CLINIC | Age: 58
End: 2023-02-27
Payer: COMMERCIAL

## 2023-02-27 NOTE — TELEPHONE ENCOUNTER
Called and spoke with patient.  Notes that she is having similar symptoms since her last visit on 6/28/21.  She is wanting to discuss the possibility of an MRI or a virtual visit.  I explained that I would forward this message to Dr. Mcgill for his recommendations on follow up.    Caitlin Kunz, ATC

## 2023-02-27 NOTE — TELEPHONE ENCOUNTER
[facility] Call Center    Phone Message    May a detailed message be left on voicemail: no     Reason for Call: Other: Patient wondering what her next step is-was last seen June 2021. She's having back pain, wondering if she should get MRI or needs to come in again or ??

## 2023-02-28 NOTE — TELEPHONE ENCOUNTER
Called and spoke with patient following huddle with Dr. Mcgill.  I explained that he would prefer to see you back in clinic for a physical exam.  Patient expressed understanding.  Scheduled for next week in [place].     Caitlin Kunz ATC

## 2023-03-06 ENCOUNTER — OFFICE VISIT (OUTPATIENT)
Dept: ORTHOPEDICS | Facility: CLINIC | Age: 58
End: 2023-03-06
Payer: COMMERCIAL

## 2023-03-06 VITALS
HEIGHT: 67 IN | SYSTOLIC BLOOD PRESSURE: 111 MMHG | BODY MASS INDEX: 24.28 KG/M2 | HEART RATE: 73 BPM | DIASTOLIC BLOOD PRESSURE: 72 MMHG

## 2023-03-06 DIAGNOSIS — M54.12 LEFT CERVICAL RADICULOPATHY: Primary | ICD-10-CM

## 2023-03-06 PROCEDURE — 99213 OFFICE O/P EST LOW 20 MIN: CPT | Performed by: FAMILY MEDICINE

## 2023-03-06 ASSESSMENT — PAIN SCALES - GENERAL: PAINLEVEL: MODERATE PAIN (4)

## 2023-03-06 NOTE — PROGRESS NOTES
"ASSESSMENT & PLAN    Lay was seen today for pain.    Diagnoses and all orders for this visit:    Left cervical radiculopathy        # Left Cervical Radiculopathy: Lay Mccain  was seen today for chronic left cervical radiculopathy. Symptoms had been going on for 6 years. On examination there are positive findings of pain over left paracervical muscles worse with compression. Imaging findings showed loss of cervical curvature, mild degenerative changes. Likely cause of patient's condition due to cervical radiculopathy. Counseled patient on nature of condition and treatment options.  Given this plan as below, follow-up as needed     Image Findings: straightening of normal cervical curvature  Treatment: Activities as tolerated, home exercises given today  Medications/Injections: Limited tylenol/ibuprofen for pain for 1-2 weeks, Topical Voltaren gel, none  Follow-up: In one month if symptoms do not improve, sooner if worsening  Can schedule cervical MRI     -----    SUBJECTIVE:  Lay Mccain is a 57 year old female who is seen in follow-up for cervical spine pain. They were last seen 6/28/21.  The patient is seen by themselves.    Since their last visit reports persisting left sided neck, thoracic, axilla and breast pain. Numbness and tingling in arm has improved. They indicate that their current pain level is 4/10. They have tried Medrol dosepak, heat, chiropractor.  Pain has been like this over the past 6 years, Medrol dosepack didn't help.       Patient's past medical, surgical, social, and family histories were reviewed today and no changes are noted.    REVIEW OF SYSTEMS:  Constitutional: NEGATIVE for fever, chills, change in weight  Skin: NEGATIVE for worrisome rashes, moles or lesions  GI/: NEGATIVE for bowel or bladder changes  Neuro: NEGATIVE for weakness, dizziness or paresthesias    OBJECTIVE:  /72   Pulse 73   Ht 1.702 m (5' 7\")   LMP 11/29/2013   BMI 24.28 kg/m     General: healthy, alert " and in no distress  HEENT: no scleral icterus or conjunctival erythema  Skin: no suspicious lesions or rash. No jaundice.  CV: regular rhythm by palpation, no pedal edema  Resp: normal respiratory effort without conversational dyspnea   Psych: normal mood and affect  Gait: normal steady gait with appropriate coordination and balance  Neuro: normal light touch sensory exam of the extremities.    MSK:    CERVICAL SPINE  Inspection:    normal cervical lordosis present, rounded shoulders, forward head posture  Palpation:    Nontender.  Range of Motion:     Flexion full    Extension full    Right side bend full    Left side bend full    Right rotation full    Left rotation full  Strength:    Full strength throughout all neck muscles  Special Tests:    Positive: None    Negative: Spurling's (bilateral)    Independent visualization of the below image:  CERVICAL SPINE THREE VIEWS  3/24/2021 4:19 PM      HISTORY: Muscle spasms of neck.     COMPARISON: 8/28/2014                                                                      IMPRESSION: Moderate multilevel degenerative disc disease is present  in the mid to lower cervical spine with progression since prior study.  Posterior alignment is normal. Vertebral body heights are maintained.  There is also some mild lower cervical degenerative facet disease.     CLAYTON MIKE MD    THORACIC SPINE TWO VIEWS  3/24/2021 4:19 PM      HISTORY: Spasm of thoracic back muscle.     COMPARISON: None.                                                                      IMPRESSION: Mild to moderate mid thoracic degenerative disc disease is  present. Vertebral body heights are maintained. Posterior alignment is  normal. Bridging syndesmophytes are noted at several levels.     MD Remi CUNNINGHAM MD, [facility]    Disclaimer: This note consists of symbols derived from keyboarding, dictation and/or voice recognition software. As a result, there  may be errors in the script that have gone undetected. Please consider this when interpreting information found in this chart.

## 2023-03-06 NOTE — LETTER
3/6/2023         RE: Lay Mccain  [address] [facility] [id number]        Dear Colleague,    Thank you for referring your patient, Lay Mccain, to the [facility] [place]. Please see a copy of my visit note below.    ASSESSMENT & PLAN    Lay was seen today for pain.    Diagnoses and all orders for this visit:    Left cervical radiculopathy        # Left Cervical Radiculopathy: Lay Mccain  was seen today for chronic left cervical radiculopathy. Symptoms had been going on for 6 years. On examination there are positive findings of pain over left paracervical muscles worse with compression. Imaging findings showed loss of cervical curvature, mild degenerative changes. Likely cause of patient's condition due to cervical radiculopathy. Counseled patient on nature of condition and treatment options.  Given this plan as below, follow-up as needed     Image Findings: straightening of normal cervical curvature  Treatment: Activities as tolerated, home exercises given today  Medications/Injections: Limited tylenol/ibuprofen for pain for 1-2 weeks, Topical Voltaren gel, none  Follow-up: In one month if symptoms do not improve, sooner if worsening  Can schedule cervical MRI     -----    SUBJECTIVE:  Lay Mccain is a 57 year old female who is seen in follow-up for cervical spine pain. They were last seen 6/28/21.  The patient is seen by themselves.    Since their last visit reports persisting left sided neck, thoracic, axilla and breast pain. Numbness and tingling in arm has improved. They indicate that their current pain level is 4/10. They have tried Medrol dosepak, heat, chiropractor.  Pain has been like this over the past 6 years, Medrol dosepack didn't help.       Patient's past medical, surgical, social, and family histories were reviewed today and no changes are noted.    REVIEW OF SYSTEMS:  Constitutional: NEGATIVE for fever, chills, change in weight  Skin: NEGATIVE for  "worrisome rashes, moles or lesions  GI/: NEGATIVE for bowel or bladder changes  Neuro: NEGATIVE for weakness, dizziness or paresthesias    OBJECTIVE:  /72   Pulse 73   Ht 1.702 m (5' 7\")   LMP 11/29/2013   BMI 24.28 kg/m     General: healthy, alert and in no distress  HEENT: no scleral icterus or conjunctival erythema  Skin: no suspicious lesions or rash. No jaundice.  CV: regular rhythm by palpation, no pedal edema  Resp: normal respiratory effort without conversational dyspnea   Psych: normal mood and affect  Gait: normal steady gait with appropriate coordination and balance  Neuro: normal light touch sensory exam of the extremities.    MSK:    CERVICAL SPINE  Inspection:    normal cervical lordosis present, rounded shoulders, forward head posture  Palpation:    Nontender.  Range of Motion:     Flexion full    Extension full    Right side bend full    Left side bend full    Right rotation full    Left rotation full  Strength:    Full strength throughout all neck muscles  Special Tests:    Positive: None    Negative: Spurling's (bilateral)    Independent visualization of the below image:  CERVICAL SPINE THREE VIEWS  3/24/2021 4:19 PM      HISTORY: Muscle spasms of neck.     COMPARISON: 8/28/2014                                                                      IMPRESSION: Moderate multilevel degenerative disc disease is present  in the mid to lower cervical spine with progression since prior study.  Posterior alignment is normal. Vertebral body heights are maintained.  There is also some mild lower cervical degenerative facet disease.     CLAYTON MIKE MD    THORACIC SPINE TWO VIEWS  3/24/2021 4:19 PM      HISTORY: Spasm of thoracic back muscle.     COMPARISON: None.                                                                      IMPRESSION: Mild to moderate mid thoracic degenerative disc disease is  present. Vertebral body heights are maintained. Posterior alignment is  normal. Bridging " syndesmophytes are noted at several levels.     MD Remi CUNNINGHAM MD, [facility]    Disclaimer: This note consists of symbols derived from keyboarding, dictation and/or voice recognition software. As a result, there may be errors in the script that have gone undetected. Please consider this when interpreting information found in this chart.          Again, thank you for allowing me to participate in the care of your patient.        Sincerely,        Remi Mcgill MD

## 2023-06-01 ENCOUNTER — HOSPITAL ENCOUNTER (OUTPATIENT)
Dept: MAMMOGRAPHY | Facility: CLINIC | Age: 58
Discharge: HOME | End: 2023-06-01
Attending: NURSE PRACTITIONER | Admitting: NURSE PRACTITIONER
Payer: COMMERCIAL

## 2023-06-01 DIAGNOSIS — Z12.31 VISIT FOR SCREENING MAMMOGRAM: ICD-10-CM

## 2023-06-01 PROCEDURE — 77067 SCR MAMMO BI INCL CAD: CPT

## 2023-09-12 ENCOUNTER — TELEPHONE (OUTPATIENT)
Dept: FAMILY MEDICINE | Facility: CLINIC | Age: 58
End: 2023-09-12
Payer: COMMERCIAL

## 2023-09-12 NOTE — TELEPHONE ENCOUNTER
Pt reports hx chronic lt cervical pain, has seen sports ortho Dr. Mcgill 03-06-23. States pain has gradually moved down lt side of back to thoracic region and now low back/ hip region past 1.5 wk. Describes pain as constant achy, sharp when leans into pain. Rates 7/10 at worst. No known injury. Does reports lifting heavy cat litter box may have triggered sx. Denies numbness, tingling, spasms.   Denies UTI sx, no fevers, chills. No noted blood in urine. States urine clear, output comparable to fluid intake. No hx UTI or kidney issues. No bowel issues.   Has been doing stretches, taking ibu PRN sparingly which does help some.  Pt has scheduled appt 10/4 with Carla. Scheduled sooner appt 9/19 to accommodate pt schedule. Advised UC if sx worsen or develops any new sx prior to appt.  Pt may also contact ortho regarding sx due to pain/ pattern lt side and extending down back.   DEBORAH Whaley RN

## 2023-09-19 ENCOUNTER — ANCILLARY PROCEDURE (OUTPATIENT)
Dept: GENERAL RADIOLOGY | Facility: CLINIC | Age: 58
End: 2023-09-19
Attending: NURSE PRACTITIONER
Payer: COMMERCIAL

## 2023-09-19 ENCOUNTER — LAB (OUTPATIENT)
Dept: FAMILY MEDICINE | Facility: CLINIC | Age: 58
End: 2023-09-19

## 2023-09-19 ENCOUNTER — OFFICE VISIT (OUTPATIENT)
Dept: FAMILY MEDICINE | Facility: CLINIC | Age: 58
End: 2023-09-19
Payer: COMMERCIAL

## 2023-09-19 VITALS
DIASTOLIC BLOOD PRESSURE: 74 MMHG | RESPIRATION RATE: 14 BRPM | OXYGEN SATURATION: 99 % | HEIGHT: 67 IN | TEMPERATURE: 97 F | SYSTOLIC BLOOD PRESSURE: 114 MMHG | WEIGHT: 144 LBS | HEART RATE: 77 BPM | BODY MASS INDEX: 22.6 KG/M2

## 2023-09-19 DIAGNOSIS — Z12.11 COLON CANCER SCREENING: ICD-10-CM

## 2023-09-19 DIAGNOSIS — M51.34 DDD (DEGENERATIVE DISC DISEASE), THORACIC: ICD-10-CM

## 2023-09-19 DIAGNOSIS — K64.9 HEMORRHOIDS, UNSPECIFIED HEMORRHOID TYPE: ICD-10-CM

## 2023-09-19 DIAGNOSIS — R10.9 ACUTE LEFT FLANK PAIN: Primary | ICD-10-CM

## 2023-09-19 DIAGNOSIS — R63.4 UNINTENTIONAL WEIGHT LOSS: ICD-10-CM

## 2023-09-19 LAB
ALBUMIN SERPL BCG-MCNC: 4.4 G/DL (ref 3.5–5.2)
ALBUMIN UR-MCNC: NEGATIVE MG/DL
ALP SERPL-CCNC: 64 U/L (ref 35–104)
ALT SERPL W P-5'-P-CCNC: 20 U/L (ref 0–50)
ANION GAP SERPL CALCULATED.3IONS-SCNC: 8 MMOL/L (ref 7–15)
APPEARANCE UR: CLEAR
AST SERPL W P-5'-P-CCNC: 23 U/L (ref 0–45)
BILIRUB SERPL-MCNC: 0.3 MG/DL
BILIRUB UR QL STRIP: NEGATIVE
BUN SERPL-MCNC: 13.8 MG/DL (ref 6–20)
CALCIUM SERPL-MCNC: 9.6 MG/DL (ref 8.6–10)
CHLORIDE SERPL-SCNC: 102 MMOL/L (ref 98–107)
COLOR UR AUTO: YELLOW
CREAT SERPL-MCNC: 0.66 MG/DL (ref 0.51–0.95)
DEPRECATED HCO3 PLAS-SCNC: 29 MMOL/L (ref 22–29)
EGFRCR SERPLBLD CKD-EPI 2021: >90 ML/MIN/1.73M2
GLUCOSE SERPL-MCNC: 98 MG/DL (ref 70–99)
GLUCOSE UR STRIP-MCNC: NEGATIVE MG/DL
HGB UR QL STRIP: NEGATIVE
KETONES UR STRIP-MCNC: NEGATIVE MG/DL
LEUKOCYTE ESTERASE UR QL STRIP: NEGATIVE
NITRATE UR QL: NEGATIVE
PH UR STRIP: 7 [PH] (ref 5–7)
POTASSIUM SERPL-SCNC: 3.9 MMOL/L (ref 3.4–5.3)
PROT SERPL-MCNC: 7.4 G/DL (ref 6.4–8.3)
SODIUM SERPL-SCNC: 139 MMOL/L (ref 136–145)
SP GR UR STRIP: 1.01 (ref 1–1.03)
TSH SERPL DL<=0.005 MIU/L-ACNC: 2.01 UIU/ML (ref 0.3–4.2)
UROBILINOGEN UR STRIP-ACNC: 0.2 E.U./DL

## 2023-09-19 PROCEDURE — 80053 COMPREHEN METABOLIC PANEL: CPT | Performed by: NURSE PRACTITIONER

## 2023-09-19 PROCEDURE — 81003 URINALYSIS AUTO W/O SCOPE: CPT | Performed by: NURSE PRACTITIONER

## 2023-09-19 PROCEDURE — 99214 OFFICE O/P EST MOD 30 MIN: CPT | Performed by: NURSE PRACTITIONER

## 2023-09-19 PROCEDURE — 84443 ASSAY THYROID STIM HORMONE: CPT | Performed by: NURSE PRACTITIONER

## 2023-09-19 PROCEDURE — 71046 X-RAY EXAM CHEST 2 VIEWS: CPT | Mod: TC | Performed by: RADIOLOGY

## 2023-09-19 PROCEDURE — 36415 COLL VENOUS BLD VENIPUNCTURE: CPT | Performed by: NURSE PRACTITIONER

## 2023-09-19 ASSESSMENT — ENCOUNTER SYMPTOMS: BACK PAIN: 1

## 2023-09-19 NOTE — PROGRESS NOTES
Assessment & Plan     Acute left flank pain  - UA Macroscopic with reflex to Microscopic and Culture - Lab Collect  - Comprehensive metabolic panel (BMP + Alb, Alk Phos, ALT, AST, Total. Bili, TP)    Unintentional weight loss  - TSH with free T4 reflex    DDD (degenerative disc disease), thoracic    - XR Chest 2 Views    Hemorrhoids, unspecified hemorrhoid type  Symptomatic care strategies reviewed      Colon cancer screening    - COLOGUARD([employer])      Call or return to the clinic with any worsening of symptoms or no resolution. Patient/Parent verbalized understanding and is in agreement. Medication side effects reviewed.   Current Outpatient Medications   Medication Sig Dispense Refill    magnesium 250 MG tablet Take 1 tablet by mouth daily      TURMERIC PO      Thirty minutes spent on chart review,education, exam and documentation    Chart documentation with Dragon Voice recognition Software. Although reviewed after completion, some words and grammatical errors may remain.      KIRA Mendez CNP  [facility]    Lucrecia Chun is a 58 year old, presenting for the following health issues:  Back Pain        9/19/2023    12:41 PM   Additional Questions   Roomed by Nova       History of Present Illness       Back Pain:  She presents for follow up of back pain. Patient's back pain is a new problem.    Original cause of back pain: not sure  First noticed back pain: 1-4 weeks ago  Patient feels back pain: dailyLocation of back pain:  Left lower back, left middle of back, left upper back, left hip and left side of waist  Description of back pain: dull ache and sharp  Back pain spreads: nowhere    Since patient first noticed back pain, pain is: always present, but gets better and worse  Does back pain interfere with her job:  Not applicable  On a scale of 1-10 (10 being the worst), patient describes pain as:  7  What makes back pain worse: bending, coughing, certain  positions, lying down, sitting, standing, stress and twisting   Acupuncture: not tried  Acetaminophen: not tried  Activity or exercise: helpful  Chiropractor:  Not helpful  Cold: not helpful  Heat: helpful  Massage: not tried  Muscle relaxants: not tried  NSAIDS: helpful  Opioids: not tried  Physical Therapy: not helpful  Rest: not helpful  Steroid Injection: not tried  Stretching: helpful  Surgery: not tried  TENS unit: not helpful  Topical pain relievers: not tried  Other healthcare providers patient is seeing for back pain: None    She eats 2-3 servings of fruits and vegetables daily.She consumes 0 sweetened beverage(s) daily.She exercises with enough effort to increase her heart rate 30 to 60 minutes per day.  She exercises with enough effort to increase her heart rate 7 days per week.   She is taking medications regularly.     Acute on chronic thoracic back pain  Working with Dr Mcgill in March for neck pain  [facility] Dr Shukla 10/30  Hoping for them to do an injection    THORACIC SPINE TWO VIEWS  3/24/2021 4:19 PM      HISTORY: Spasm of thoracic back muscle.     COMPARISON: None.                                                                      IMPRESSION: Mild to moderate mid thoracic degenerative disc disease is  present. Vertebral body heights are maintained. Posterior alignment is  normal. Bridging syndesmophytes are noted at several levels.     CLAYTON MIKE MD    Narrative & Impression   CERVICAL SPINE THREE VIEWS  3/24/2021 4:19 PM      HISTORY: Muscle spasms of neck.     COMPARISON: 8/28/2014                                                                      IMPRESSION: Moderate multilevel degenerative disc disease is present  in the mid to lower cervical spine with progression since prior study.  Posterior alignment is normal. Vertebral body heights are maintained.  There is also some mild lower cervical degenerative facet disease.     CLAYTON MIKE MD               Review of Systems  "  Musculoskeletal:  Positive for back pain.      Constitutional, HEENT, cardiovascular, pulmonary, GI, , musculoskeletal, neuro, skin, endocrine and psych systems are negative, except as otherwise noted.      Objective    /74   Pulse 77   Temp 97  F (36.1  C) (Tympanic)   Resp 14   Ht 1.702 m (5' 7\")   Wt 65.3 kg (144 lb)   LMP 11/29/2013   SpO2 99%   BMI 22.55 kg/m    Body mass index is 22.55 kg/m .  Physical Exam   GENERAL: healthy, alert and no distress  EYES: Eyes grossly normal to inspection, PERRL and conjunctivae and sclerae normal  HENT: ear canals and TM's normal, nose and mouth without ulcers or lesions  NECK: no adenopathy, no asymmetry, masses, or scars and thyroid normal to palpation  RESP: lungs clear to auscultation - no rales, rhonchi or wheezes  CV: regular rate and rhythm, normal S1 S2, no S3 or S4, no murmur, click or rub, no peripheral edema and peripheral pulses strong  ABDOMEN: soft, nontender, no hepatosplenomegaly, no masses and bowel sounds normal  MS: no gross musculoskeletal defects noted, no edema: tenderness to palpation left flank   SKIN: no suspicious lesions or rashes  NEURO: Normal strength and tone, mentation intact and speech normal  PSYCH: mentation appears normal, affect normal/bright    Results for orders placed or performed in visit on 09/19/23   XR Chest 2 Views     Status: None (Preliminary result)    Narrative    CHEST TWO VIEWS September 19, 2023 1:39 PM     HISTORY: DDD (degenerative disc disease), thoracic.    COMPARISON: None.      Impression    IMPRESSION: No acute cardiopulmonary disease.   Results for orders placed or performed in visit on 09/19/23   UA Macroscopic with reflex to Microscopic and Culture - Lab Collect     Status: Normal    Specimen: Urine, Midstream   Result Value Ref Range    Color Urine Yellow Colorless, Straw, Light Yellow, Yellow    Appearance Urine Clear Clear    Glucose Urine Negative Negative mg/dL    Bilirubin Urine Negative " Negative    Ketones Urine Negative Negative mg/dL    Specific Gravity Urine 1.010 1.003 - 1.035    Blood Urine Negative Negative    pH Urine 7.0 5.0 - 7.0    Protein Albumin Urine Negative Negative mg/dL    Urobilinogen Urine 0.2 0.2, 1.0 E.U./dL    Nitrite Urine Negative Negative    Leukocyte Esterase Urine Negative Negative    Narrative    Microscopic not indicated

## 2023-09-28 ENCOUNTER — MYC MEDICAL ADVICE (OUTPATIENT)
Dept: FAMILY MEDICINE | Facility: CLINIC | Age: 58
End: 2023-09-28
Payer: COMMERCIAL

## 2023-10-23 LAB — NONINV COLON CA DNA+OCC BLD SCRN STL QL: NEGATIVE

## 2024-04-14 ENCOUNTER — HEALTH MAINTENANCE LETTER (OUTPATIENT)
Age: 59
End: 2024-04-14

## 2024-07-03 ENCOUNTER — PATIENT OUTREACH (OUTPATIENT)
Dept: CARE COORDINATION | Facility: CLINIC | Age: 59
End: 2024-07-03
Payer: COMMERCIAL

## 2025-03-31 SDOH — HEALTH STABILITY: PHYSICAL HEALTH: ON AVERAGE, HOW MANY DAYS PER WEEK DO YOU ENGAGE IN MODERATE TO STRENUOUS EXERCISE (LIKE A BRISK WALK)?: 7 DAYS

## 2025-03-31 SDOH — HEALTH STABILITY: PHYSICAL HEALTH: ON AVERAGE, HOW MANY MINUTES DO YOU ENGAGE IN EXERCISE AT THIS LEVEL?: 40 MIN

## 2025-03-31 ASSESSMENT — SOCIAL DETERMINANTS OF HEALTH (SDOH): HOW OFTEN DO YOU GET TOGETHER WITH FRIENDS OR RELATIVES?: MORE THAN THREE TIMES A WEEK

## 2025-04-01 ENCOUNTER — HOSPITAL ENCOUNTER (OUTPATIENT)
Dept: MAMMOGRAPHY | Facility: CLINIC | Age: 60
Discharge: HOME | End: 2025-04-01
Attending: NURSE PRACTITIONER | Admitting: NURSE PRACTITIONER
Payer: COMMERCIAL

## 2025-04-01 ENCOUNTER — OFFICE VISIT (OUTPATIENT)
Dept: FAMILY MEDICINE | Facility: CLINIC | Age: 60
End: 2025-04-01
Payer: COMMERCIAL

## 2025-04-01 VITALS
OXYGEN SATURATION: 99 % | BODY MASS INDEX: 22.13 KG/M2 | HEART RATE: 78 BPM | SYSTOLIC BLOOD PRESSURE: 118 MMHG | HEIGHT: 67 IN | RESPIRATION RATE: 14 BRPM | WEIGHT: 141 LBS | DIASTOLIC BLOOD PRESSURE: 74 MMHG | TEMPERATURE: 97.3 F

## 2025-04-01 DIAGNOSIS — Z00.00 ROUTINE GENERAL MEDICAL EXAMINATION AT A HEALTH CARE FACILITY: Primary | ICD-10-CM

## 2025-04-01 DIAGNOSIS — Z12.31 SCREENING MAMMOGRAM, ENCOUNTER FOR: ICD-10-CM

## 2025-04-01 DIAGNOSIS — Z13.220 SCREENING CHOLESTEROL LEVEL: ICD-10-CM

## 2025-04-01 LAB
CHOLEST SERPL-MCNC: 186 MG/DL
FASTING STATUS PATIENT QL REPORTED: YES
HDLC SERPL-MCNC: 78 MG/DL
LDLC SERPL CALC-MCNC: 95 MG/DL
NONHDLC SERPL-MCNC: 108 MG/DL
TRIGL SERPL-MCNC: 63 MG/DL

## 2025-04-01 PROCEDURE — 1126F AMNT PAIN NOTED NONE PRSNT: CPT | Performed by: NURSE PRACTITIONER

## 2025-04-01 PROCEDURE — 80061 LIPID PANEL: CPT | Performed by: NURSE PRACTITIONER

## 2025-04-01 PROCEDURE — 77063 BREAST TOMOSYNTHESIS BI: CPT

## 2025-04-01 PROCEDURE — 3078F DIAST BP <80 MM HG: CPT | Performed by: NURSE PRACTITIONER

## 2025-04-01 PROCEDURE — 3074F SYST BP LT 130 MM HG: CPT | Performed by: NURSE PRACTITIONER

## 2025-04-01 PROCEDURE — 36415 COLL VENOUS BLD VENIPUNCTURE: CPT | Performed by: NURSE PRACTITIONER

## 2025-04-01 PROCEDURE — 99396 PREV VISIT EST AGE 40-64: CPT | Performed by: NURSE PRACTITIONER

## 2025-04-01 ASSESSMENT — PAIN SCALES - GENERAL: PAINLEVEL_OUTOF10: NO PAIN (0)

## 2025-04-01 NOTE — PATIENT INSTRUCTIONS
Thank you for coming to see me today! Here are a couple of pieces of information about messages and lab communication practices.     If lab work was done today as part of your evaluation you will generally be contacted via [facility], mail, or phone with the results within 7-10 days.  If there is an alarming/concerning result we will contact you by phone. Lab results come back at varying times, I generally wait until all labs are resulted before making comments on results. Please note, labs are automatically released to [facility] once available, but it may take a couple of days for my interpretation note to appear.     I try very hard to respond to medical messages with 2 business days of receiving them. Occasionally it takes me longer if I am trying to figure out the best way to respond and need to seek guidance, do some research or dig deeper into your medical history to come up with a helpful response.     If you need refills please contact your pharmacist. They will send a refill request to me to review. Please allow 3-5 business days for us to respond to all refill requests.     Please call or send a medical message with any questions or concerns. Thank you for trusting me to be part of your healthcare team!    Patient Education   Preventive Care Advice   This is general advice given by our system to help you stay healthy. However, your care team may have specific advice just for you. Please talk to your care team about your preventive care needs.  Nutrition  Eat 5 or more servings of fruits and vegetables each day.  Try wheat bread, brown rice and whole grain pasta (instead of white bread, rice, and pasta).  Get enough calcium and vitamin D. Check the label on foods and aim for 100% of the RDA (recommended daily allowance).  Lifestyle  Exercise at least 150 minutes each week  (30 minutes a day, 5 days a week).  Do muscle strengthening activities 2 days a week. These help control your weight and prevent disease.  No  smoking.  Wear sunscreen to prevent skin cancer.  Have a dental exam and cleaning every 6 months.  Yearly exams  See your health care team every year to talk about:  Any changes in your health.  Any medicines your care team has prescribed.  Preventive care, family planning, and ways to prevent chronic diseases.  Shots (vaccines)   HPV shots (up to age 26), if you've never had them before.  Hepatitis B shots (up to age 59), if you've never had them before.  COVID-19 shot: Get this shot when it's due.  Flu shot: Get a flu shot every year.  Tetanus shot: Get a tetanus shot every 10 years.  Pneumococcal, hepatitis A, and RSV shots: Ask your care team if you need these based on your risk.  Shingles shot (for age 50 and up)  General health tests  Diabetes screening:  Starting at age 35, Get screened for diabetes at least every 3 years.  If you are younger than age 35, ask your care team if you should be screened for diabetes.  Cholesterol test: At age 39, start having a cholesterol test every 5 years, or more often if advised.  Bone density scan (DEXA): At age 50, ask your care team if you should have this scan for osteoporosis (brittle bones).  Hepatitis C: Get tested at least once in your life.  STIs (sexually transmitted infections)  Before age 24: Ask your care team if you should be screened for STIs.  After age 24: Get screened for STIs if you're at risk. You are at risk for STIs (including HIV) if:  You are sexually active with more than one person.  You don't use condoms every time.  You or a partner was diagnosed with a sexually transmitted infection.  If you are at risk for HIV, ask about PrEP medicine to prevent HIV.  Get tested for HIV at least once in your life, whether you are at risk for HIV or not.  Cancer screening tests  Cervical cancer screening: If you have a cervix, begin getting regular cervical cancer screening tests starting at age 21.  Breast cancer scan (mammogram): If you've ever had breasts,  begin having regular mammograms starting at age 40. This is a scan to check for breast cancer.  Colon cancer screening: It is important to start screening for colon cancer at age 45.  Have a colonoscopy test every 10 years (or more often if you're at risk) Or, ask your provider about stool tests like a FIT test every year or Cologuard test every 3 years.  To learn more about your testing options, visit:   .  For help making a decision, visit:   https://bit.ly/ss58449.  Prostate cancer screening test: If you have a prostate, ask your care team if a prostate cancer screening test (PSA) at age 55 is right for you.  Lung cancer screening: If you are a current or former smoker ages 50 to 80, ask your care team if ongoing lung cancer screenings are right for you.  For informational purposes only. Not to replace the advice of your health care provider. Copyright   2023 [employer]. All rights reserved. Clinically reviewed by the [facility] Transitions Program. "[employer] 333467 - REV 01/24.  Learning About Stress  What is stress?     Stress is your body's response to a hard situation. Your body can have a physical, emotional, or mental response. Stress is a fact of life for most people, and it affects everyone differently. What causes stress for you may not be stressful for someone else.  A lot of things can cause stress. You may feel stress when you go on a job interview, take a test, or run a race. This kind of short-term stress is normal and even useful. It can help you if you need to work hard or react quickly. For example, stress can help you finish an important job on time.  Long-term stress is caused by ongoing stressful situations or events. Examples of long-term stress include long-term health problems, ongoing problems at work, or conflicts in your family. Long-term stress can harm your health.  How does stress affect your health?  When you are stressed, your body responds as though you are in  danger. It makes hormones that speed up your heart, make you breathe faster, and give you a burst of energy. This is called the fight-or-flight stress response. If the stress is over quickly, your body goes back to normal and no harm is done.  But if stress happens too often or lasts too long, it can have bad effects. Long-term stress can make you more likely to get sick, and it can make symptoms of some diseases worse. If you tense up when you are stressed, you may develop neck, shoulder, or low back pain. Stress is linked to high blood pressure and heart disease.  Stress also harms your emotional health. It can make you hermosillo, tense, or depressed. Your relationships may suffer, and you may not do well at work or school.  What can you do to manage stress?  You can try these things to help manage stress:   Do something active. Exercise or activity can help reduce stress. Walking is a great way to get started. Even everyday activities such as housecleaning or yard work can help.  Try yoga or gerson chi. These techniques combine exercise and meditation. You may need some training at first to learn them.  Do something you enjoy. For example, listen to music or go to a movie. Practice your hobby or do volunteer work.  Meditate. This can help you relax, because you are not worrying about what happened before or what may happen in the future.  Do guided imagery. Imagine yourself in any setting that helps you feel calm. You can use online videos, books, or a teacher to guide you.  Do breathing exercises. For example:  From a standing position, bend forward from the waist with your knees slightly bent. Let your arms dangle close to the floor.  Breathe in slowly and deeply as you return to a standing position. Roll up slowly and lift your head last.  Hold your breath for just a few seconds in the standing position.  Breathe out slowly and bend forward from the waist.  Let your feelings out. Talk, laugh, cry, and express anger  "when you need to. Talking with supportive friends or family, a counselor, or a nataliya leader about your feelings is a healthy way to relieve stress. Avoid discussing your feelings with people who make you feel worse.  Write. It may help to write about things that are bothering you. This helps you find out how much stress you feel and what is causing it. When you know this, you can find better ways to cope.  What can you do to prevent stress?  You might try some of these things to help prevent stress:  Manage your time. This helps you find time to do the things you want and need to do.  Get enough sleep. Your body recovers from the stresses of the day while you are sleeping.  Get support. Your family, friends, and community can make a difference in how you experience stress.  Limit your news feed. Avoid or limit time on social media or news that may make you feel stressed.  Do something active. Exercise or activity can help reduce stress. Walking is a great way to get started.  Where can you learn more?  Go to https://www.[employer].net/patiented  Enter N032 in the search box to learn more about \"Learning About Stress.\"  Current as of: October 24, 2024  Content Version: 14.4    5828-9005 [employer].   Care instructions adapted under license by your healthcare professional. If you have questions about a medical condition or this instruction, always ask your healthcare professional. [employer] disclaims any warranty or liability for your use of this information.       "

## 2025-04-01 NOTE — PROGRESS NOTES
Preventive Care Visit  [facility]  KIRA Mendez CNP, Family Medicine  Apr 1, 2025      Assessment & Plan     Routine general medical examination at a health care facility      Screening cholesterol level      - Lipid panel reflex to direct LDL Fasting            Counseling  Appropriate preventive services were addressed with this patient via screening, questionnaire, or discussion as appropriate for fall prevention, nutrition, physical activity, Tobacco-use cessation, social engagement, weight loss and cognition.  Checklist reviewing preventive services available has been given to the patient.  Reviewed patient's diet, addressing concerns and/or questions.   She is at risk for psychosocial distress and has been provided with information to reduce risk.       Call or return to the clinic with any worsening of symptoms or no resolution. Patient/Parent verbalized understanding and is in agreement. Medication side effects reviewed.   Current Outpatient Medications   Medication Sig Dispense Refill    magnesium 250 MG tablet Take 1 tablet by mouth daily      TURMERDAKOTA Carrasco MSN,FN-[address] [address]  [phone]        See Patient Instructions    Lucrecia Chun is a 59 year old, presenting for the following:  Physical        4/1/2025    10:46 AM   Additional Questions   Roomed by Kristan BETTENCOURT  No concerns  Fasting for cholesterol           Advance Care Planning  Patient does not have a Health Care Directive:       3/31/2025   General Health   How would you rate your overall physical health? Good   Feel stress (tense, anxious, or unable to sleep) To some extent   (!) STRESS CONCERN      3/31/2025   Nutrition   Three or more servings of calcium each day? Yes   Diet: Regular (no restrictions)   How many servings of fruit and vegetables per day? 4 or more   How many sweetened beverages  each day? 0-1         3/31/2025   Exercise   Days per week of moderate/strenous exercise 7 days   Average minutes spent exercising at this level 40 min         3/31/2025   Social Factors   Frequency of gathering with friends or relatives More than three times a week   Worry food won't last until get money to buy more No   Food not last or not have enough money for food? No   Do you have housing? (Housing is defined as stable permanent housing and does not include staying ouside in a car, in a tent, in an abandoned building, in an overnight shelter, or couch-surfing.) Yes   Are you worried about losing your housing? No   Lack of transportation? No   Unable to get utilities (heat,electricity)? No         3/31/2025   Fall Risk   Fallen 2 or more times in the past year? No   Trouble with walking or balance? No          3/31/2025   Dental   Dentist two times every year? Yes           Today's PHQ-2 Score:       3/31/2025     9:04 AM   PHQ-2 ( 1999 [employer])   Q1: Little interest or pleasure in doing things 0   Q2: Feeling down, depressed or hopeless 0   PHQ-2 Score 0    Q1: Little interest or pleasure in doing things Not at all   Q2: Feeling down, depressed or hopeless Not at all   PHQ-2 Score 0       Patient-reported           3/31/2025   Substance Use   Alcohol more than 3/day or more than 7/wk No   Do you use any other substances recreationally? No     Social History     Tobacco Use    Smoking status: Never    Smokeless tobacco: Never   Substance Use Topics    Alcohol use: Yes     Comment: Avg. 5 drinks per week    Drug use: No           6/1/2023   LAST FHS-7 RESULTS   1st degree relative breast or ovarian cancer No   Any relative bilateral breast cancer No   Any male have breast cancer No   Any ONE woman have BOTH breast AND ovarian cancer No   Any woman with breast cancer before 50yrs No   2 or more relatives with breast AND/OR ovarian cancer No   2 or more relatives with breast AND/OR bowel cancer No        Mammogram  "Screening - Mammogram every 1-2 years updated in Health Maintenance based on mutual decision making          3/31/2025   One time HIV Screening   Previous HIV test? No         3/31/2025   STI Screening   New sexual partner(s) since last STI/HIV test? No     History of abnormal Pap smear: No - age 30- 64 PAP with HPV every 5 years recommended        Latest Ref Rng & Units 2023     8:28 AM 2018     9:00 AM 2018     8:52 AM   PAP / HPV   PAP  Negative for Intraepithelial Lesion or Malignancy (NILM)      PAP (Historical)    NIL    HPV 16 DNA Negative Negative  Negative     HPV 18 DNA Negative Negative  Negative     Other HR HPV Negative Negative  Negative       ASCVD Risk   The ASCVD Risk score (Mary MARIN, et al., 2019) failed to calculate for the following reasons:    Cannot find a previous HDL lab    Cannot find a previous total cholesterol lab           Reviewed and updated as needed this visit by Provider                    Past Medical History:   Diagnosis Date    ASCUS favor benign     -HPV per [facility] rpt cotesting 3 yrs:     Vulvodynia      Past Surgical History:   Procedure Laterality Date    NO HISTORY OF SURGERY  2011     OB History    Para Term  AB Living   2 1 1 0 1 1   SAB IAB Ectopic Multiple Live Births   1 0 0 0 1      # Outcome Date GA Lbr Bryant/2nd Weight Sex Type Anes PTL Lv   2 SAB      SAB   DEC    Term 04 40w0d  3.856 kg (8 lb 8 oz) F    GWENDOLYN         Review of Systems  Constitutional, neuro, ENT, endocrine, pulmonary, cardiac, gastrointestinal, genitourinary, musculoskeletal, integument and psychiatric systems are negative, except as otherwise noted.     Objective    Exam  /74   Pulse 78   Temp 97.3  F (36.3  C) (Tympanic)   Resp 14   Ht 1.695 m (5' 6.75\")   Wt 64 kg (141 lb)   LMP 2013   SpO2 99%   BMI 22.25 kg/m     Estimated body mass index is 22.25 kg/m  as calculated from the following:    Height as of this encounter: " "1.695 m (5' 6.75\").    Weight as of this encounter: 64 kg (141 lb).    Physical Exam  GENERAL: alert and no distress  EYES: Eyes grossly normal to inspection, PERRL and conjunctivae and sclerae normal  HENT: ear canals and TM's normal, nose and mouth without ulcers or lesions  NECK: no adenopathy, no asymmetry, masses, or scars  RESP: lungs clear to auscultation - no rales, rhonchi or wheezes  CV: regular rate and rhythm, normal S1 S2, no S3 or S4, no murmur, click or rub, no peripheral edema  ABDOMEN: soft, nontender, no hepatosplenomegaly, no masses and bowel sounds normal  MS: no gross musculoskeletal defects noted, no edema  SKIN: no suspicious lesions or rashes  NEURO: Normal strength and tone, mentation intact and speech normal  PSYCH: mentation appears normal, affect normal/bright        Signed Electronically by: KIRA Mendez CNP    "